# Patient Record
Sex: FEMALE | Race: BLACK OR AFRICAN AMERICAN | NOT HISPANIC OR LATINO | Employment: UNEMPLOYED | URBAN - METROPOLITAN AREA
[De-identification: names, ages, dates, MRNs, and addresses within clinical notes are randomized per-mention and may not be internally consistent; named-entity substitution may affect disease eponyms.]

---

## 2017-01-05 ENCOUNTER — TELEPHONE (OUTPATIENT)
Dept: GYNECOLOGIC ONCOLOGY | Facility: CLINIC | Age: 57
End: 2017-01-05

## 2017-01-05 NOTE — TELEPHONE ENCOUNTER
----- Message from Annia Ng sent at 1/5/2017  2:04 PM CST -----  Contact: self  Pt is calling to schedule her f/u apt.     Contact number is [phone]

## 2017-01-05 NOTE — TELEPHONE ENCOUNTER
Spoke with pt and scheduled her 6 month follow up with DR Zhao on 01/16/2017 at 1:30 [facility] location appt letter mailed to address on file

## 2017-01-16 ENCOUNTER — OFFICE VISIT (OUTPATIENT)
Dept: GYNECOLOGIC ONCOLOGY | Facility: CLINIC | Age: 57
End: 2017-01-16
Attending: OBSTETRICS & GYNECOLOGY
Payer: MEDICAID

## 2017-01-16 VITALS
HEIGHT: 67 IN | BODY MASS INDEX: 33.21 KG/M2 | HEART RATE: 69 BPM | WEIGHT: 211.63 LBS | SYSTOLIC BLOOD PRESSURE: 180 MMHG | DIASTOLIC BLOOD PRESSURE: 92 MMHG

## 2017-01-16 DIAGNOSIS — N89.3 VAGINAL DYSPLASIA: Primary | ICD-10-CM

## 2017-01-16 PROCEDURE — 99999 PR PBB SHADOW E&M-EST. PATIENT-LVL III: CPT | Mod: PBBFAC,,, | Performed by: OBSTETRICS & GYNECOLOGY

## 2017-01-16 PROCEDURE — 99214 OFFICE O/P EST MOD 30 MIN: CPT | Mod: S$PBB,,, | Performed by: OBSTETRICS & GYNECOLOGY

## 2017-01-16 PROCEDURE — 99213 OFFICE O/P EST LOW 20 MIN: CPT | Mod: PBBFAC | Performed by: OBSTETRICS & GYNECOLOGY

## 2017-01-16 PROCEDURE — 88175 CYTOPATH C/V AUTO FLUID REDO: CPT | Performed by: PATHOLOGY

## 2017-01-16 PROCEDURE — 88141 CYTOPATH C/V INTERPRET: CPT | Mod: ,,, | Performed by: PATHOLOGY

## 2017-01-16 RX ORDER — OMEPRAZOLE 40 MG/1
CAPSULE, DELAYED RELEASE ORAL
Refills: 4 | COMMUNITY
Start: 2017-01-06 | End: 2018-09-14

## 2017-01-22 NOTE — PROGRESS NOTES
Subjective:      Patient ID: Yanique Thomson is a 56 y.o. female.    Chief Complaint: Vaginal Dysplasia (6 month)      HPI  Patient is a 56yp female who originally presented as a referral from Dr. Addis Galeana for moderate vaginal dysplasia. 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     Presents today for follow up visit. Without gynecologic complaints.     Review of Systems   Constitutional: Negative for appetite change, chills, fatigue and fever.   HENT: Negative for mouth sores.    Respiratory: Negative for cough and shortness of breath.    Cardiovascular: Negative for leg swelling.   Gastrointestinal: Negative for abdominal pain, blood in stool, constipation and diarrhea.   Endocrine: Negative for cold intolerance.   Genitourinary: Negative for dysuria and vaginal bleeding.   Musculoskeletal: Negative for myalgias.   Skin: Negative for rash.   Allergic/Immunologic: Negative.    Neurological: Negative for weakness and numbness.   Hematological: Negative for adenopathy. Does not bruise/bleed easily.   Psychiatric/Behavioral: Negative for confusion.        Objective:   Physical Exam:   Constitutional: She is oriented to person, place, and time. She appears well-developed and well-nourished.    HENT:   Head: Normocephalic and atraumatic.    Eyes: EOM are normal. Pupils are equal, round, and reactive to light.    Neck: Normal range of motion. Neck supple. No thyromegaly present.    Cardiovascular: Normal rate, regular rhythm and intact distal pulses.     Pulmonary/Chest: Effort normal and breath sounds normal. No respiratory distress. She has no wheezes.        Abdominal: Soft. Bowel sounds are normal. She exhibits no distension, no ascites and no mass. There is no tenderness.     Genitourinary: Rectum normal and vagina normal. Pelvic exam was performed with patient supine. There is  no lesion on the right labia. There is no lesion on the left labia. Uterus is absent. Right adnexum displays no mass. Left adnexum displays no mass. Vaginal cuff normal.          Musculoskeletal: Normal range of motion and moves all extremeties.      Lymphadenopathy:     She has no cervical adenopathy.        Right: No inguinal and no supraclavicular adenopathy present.        Left: No inguinal and no supraclavicular adenopathy present.    Neurological: She is alert and oriented to person, place, and time.    Skin: Skin is warm and dry. No rash noted.    Psychiatric: She has a normal mood and affect.       Assessment:     1. Vaginal dysplasia      - no evidence of disease on today's exam  - pap collected    Plan:   No orders of the defined types were placed in this encounter.    1. RTC 6 months for sooner if needed pending pap smear results

## 2017-01-25 ENCOUNTER — TELEPHONE (OUTPATIENT)
Dept: GYNECOLOGIC ONCOLOGY | Facility: CLINIC | Age: 57
End: 2017-01-25

## 2017-01-25 NOTE — TELEPHONE ENCOUNTER
----- Message from Carlene Zhao MD sent at 1/25/2017  4:16 PM CST -----  Please let patient know her pap smear was normal. Plan for follow up visit in 6 months. Thank you.

## 2017-02-01 DIAGNOSIS — N89.3 VAGINAL DYSPLASIA: ICD-10-CM

## 2017-02-01 DIAGNOSIS — N95.2 VAGINAL ATROPHY: ICD-10-CM

## 2017-02-01 RX ORDER — ESTRADIOL 0.1 MG/G
CREAM VAGINAL
Qty: 42.5 G | Refills: 0 | OUTPATIENT
Start: 2017-02-01

## 2017-07-20 ENCOUNTER — TELEPHONE (OUTPATIENT)
Dept: GYNECOLOGIC ONCOLOGY | Facility: CLINIC | Age: 57
End: 2017-07-20

## 2017-07-20 NOTE — TELEPHONE ENCOUNTER
----- Message from Dayanara Smalls sent at 7/20/2017 12:52 PM CDT -----  Contact: Pt  Pt would like to reschedule her 07/17 appt she canceled     Pt contact number [phone]  Thanks

## 2017-07-24 ENCOUNTER — OFFICE VISIT (OUTPATIENT)
Dept: GYNECOLOGIC ONCOLOGY | Facility: CLINIC | Age: 57
End: 2017-07-24
Attending: OBSTETRICS & GYNECOLOGY
Payer: MEDICAID

## 2017-07-24 VITALS
WEIGHT: 213.19 LBS | DIASTOLIC BLOOD PRESSURE: 73 MMHG | BODY MASS INDEX: 33.39 KG/M2 | SYSTOLIC BLOOD PRESSURE: 136 MMHG | HEART RATE: 68 BPM

## 2017-07-24 DIAGNOSIS — N89.3 VAGINAL DYSPLASIA: ICD-10-CM

## 2017-07-24 DIAGNOSIS — L02.92 FURUNCLE: Primary | ICD-10-CM

## 2017-07-24 PROCEDURE — 88175 CYTOPATH C/V AUTO FLUID REDO: CPT

## 2017-07-24 PROCEDURE — 99212 OFFICE O/P EST SF 10 MIN: CPT | Mod: PBBFAC | Performed by: OBSTETRICS & GYNECOLOGY

## 2017-07-24 PROCEDURE — 99213 OFFICE O/P EST LOW 20 MIN: CPT | Mod: S$PBB,,, | Performed by: OBSTETRICS & GYNECOLOGY

## 2017-07-24 PROCEDURE — 99999 PR PBB SHADOW E&M-EST. PATIENT-LVL II: CPT | Mod: PBBFAC,,, | Performed by: OBSTETRICS & GYNECOLOGY

## 2017-07-24 RX ORDER — SULFAMETHOXAZOLE AND TRIMETHOPRIM 800; 160 MG/1; MG/1
1 TABLET ORAL 2 TIMES DAILY
Qty: 20 TABLET | Refills: 0 | Status: SHIPPED | OUTPATIENT
Start: 2017-07-24 | End: 2017-08-03

## 2017-07-24 RX ORDER — PANCRELIPASE LIPASE, PANCRELIPASE AMYLASE, AND PANCRELIPASE PROTEASE 10500; 61500; 35500 [USP'U]/1; [USP'U]/1; [USP'U]/1
CAPSULE, DELAYED RELEASE ORAL
Refills: 4 | COMMUNITY
Start: 2017-07-20 | End: 2018-08-25

## 2017-07-30 NOTE — PROGRESS NOTES
Subjective:      Patient ID: Yanique Thomson is a 57 y.o. female.    Chief Complaint: Follow-up (6mth)      HPI  Patient is a 56yp female who originally presented as a referral from Dr. Addis Galeana for moderate vaginal dysplasia. 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      Follow up pap 1/16/17 negative for dysplasia.     Presents today for follow up visit. Without gynecologic complaints.   Review of Systems   Constitutional: Negative for appetite change, chills, fatigue and fever.   HENT: Negative for mouth sores.    Respiratory: Negative for cough and shortness of breath.    Cardiovascular: Negative for leg swelling.   Gastrointestinal: Negative for abdominal pain, blood in stool, constipation and diarrhea.   Endocrine: Negative for cold intolerance.   Genitourinary: Negative for dysuria and vaginal bleeding.   Musculoskeletal: Negative for myalgias.   Skin: Negative for rash.   Allergic/Immunologic: Negative.    Neurological: Negative for weakness and numbness.   Hematological: Negative for adenopathy. Does not bruise/bleed easily.   Psychiatric/Behavioral: Negative for confusion.        Objective:   Physical Exam:   Constitutional: She is oriented to person, place, and time. She appears well-developed and well-nourished.    HENT:   Head: Normocephalic and atraumatic.    Eyes: EOM are normal. Pupils are equal, round, and reactive to light.    Neck: Normal range of motion. Neck supple. No thyromegaly present.    Cardiovascular: Normal rate, regular rhythm and intact distal pulses.     Pulmonary/Chest: Effort normal and breath sounds normal. No respiratory distress. She has no wheezes.        Abdominal: Soft. Bowel sounds are normal. She exhibits no distension, no ascites and no mass. There is no tenderness.     Genitourinary: Rectum normal and vagina normal. Pelvic exam was  performed with patient supine. There is no lesion on the right labia. There is no lesion on the left labia. Uterus is absent. Right adnexum displays no mass. Left adnexum displays no mass. Vaginal cuff normal.Cervix exhibits absence. Additional cervical findings: pap smear done          Musculoskeletal: Normal range of motion and moves all extremeties.      Lymphadenopathy:     She has no cervical adenopathy.        Right: No inguinal and no supraclavicular adenopathy present.        Left: No inguinal and no supraclavicular adenopathy present.    Neurological: She is alert and oriented to person, place, and time.    Skin: Skin is warm and dry. No rash noted.    Psychiatric: She has a normal mood and affect.       Assessment:     1. Furuncle    2. Vaginal dysplasia      - no gross evidence of disease on today's exam  - pap collected    Plan:   No orders of the defined types were placed in this encounter.    If pap remains normal will plan for return to routine gynecologic care with Dr. Galeana.

## 2017-07-31 ENCOUNTER — TELEPHONE (OUTPATIENT)
Dept: GYNECOLOGIC ONCOLOGY | Facility: CLINIC | Age: 57
End: 2017-07-31

## 2017-07-31 NOTE — TELEPHONE ENCOUNTER
----- Message from Carlene Zhao MD sent at 7/29/2017  1:42 PM CDT -----  Please let patient know her pap smear was normal. Thank you.

## 2017-10-13 ENCOUNTER — HOSPITAL ENCOUNTER (EMERGENCY)
Facility: HOSPITAL | Age: 57
Discharge: HOME | End: 2017-10-14
Attending: EMERGENCY MEDICINE
Payer: MEDICAID

## 2017-10-13 VITALS
DIASTOLIC BLOOD PRESSURE: 97 MMHG | OXYGEN SATURATION: 99 % | WEIGHT: 200 LBS | TEMPERATURE: 98 F | BODY MASS INDEX: 32.14 KG/M2 | HEART RATE: 72 BPM | RESPIRATION RATE: 18 BRPM | SYSTOLIC BLOOD PRESSURE: 163 MMHG | HEIGHT: 66 IN

## 2017-10-13 DIAGNOSIS — N39.0 URINARY TRACT INFECTION WITHOUT HEMATURIA, SITE UNSPECIFIED: Primary | ICD-10-CM

## 2017-10-13 DIAGNOSIS — B37.31 VAGINAL CANDIDIASIS: ICD-10-CM

## 2017-10-13 LAB
BACTERIA #/AREA URNS HPF: ABNORMAL /HPF
BACTERIA GENITAL QL WET PREP: ABNORMAL
BILIRUB UR QL STRIP: NEGATIVE
CLARITY UR: CLEAR
CLUE CELLS VAG QL WET PREP: ABNORMAL
COLOR UR: YELLOW
FILAMENT FUNGI VAG WET PREP-#/AREA: ABNORMAL
GLUCOSE UR QL STRIP: NEGATIVE
HGB UR QL STRIP: NEGATIVE
KETONES UR QL STRIP: NEGATIVE
LEUKOCYTE ESTERASE UR QL STRIP: ABNORMAL
MICROSCOPIC COMMENT: ABNORMAL
NITRITE UR QL STRIP: NEGATIVE
PH UR STRIP: 6 [PH] (ref 5–8)
PROT UR QL STRIP: NEGATIVE
SP GR UR STRIP: 1.02 (ref 1–1.03)
SPECIMEN SOURCE: ABNORMAL
SQUAMOUS #/AREA URNS HPF: 7 /HPF
T VAGINALIS GENITAL QL WET PREP: ABNORMAL
URN SPEC COLLECT METH UR: ABNORMAL
UROBILINOGEN UR STRIP-ACNC: NEGATIVE EU/DL
WBC #/AREA URNS HPF: 7 /HPF (ref 0–5)
WBC #/AREA VAG WET PREP: ABNORMAL
YEAST GENITAL QL WET PREP: ABNORMAL

## 2017-10-13 PROCEDURE — 99284 EMERGENCY DEPT VISIT MOD MDM: CPT

## 2017-10-13 PROCEDURE — 81000 URINALYSIS NONAUTO W/SCOPE: CPT

## 2017-10-13 PROCEDURE — 87086 URINE CULTURE/COLONY COUNT: CPT

## 2017-10-13 PROCEDURE — 87591 N.GONORRHOEAE DNA AMP PROB: CPT

## 2017-10-13 PROCEDURE — 87210 SMEAR WET MOUNT SALINE/INK: CPT

## 2017-10-13 RX ORDER — PHENAZOPYRIDINE HYDROCHLORIDE 100 MG/1
200 TABLET, FILM COATED ORAL
Status: COMPLETED | OUTPATIENT
Start: 2017-10-14 | End: 2017-10-14

## 2017-10-13 RX ORDER — FLUCONAZOLE 50 MG/1
200 TABLET ORAL
Status: COMPLETED | OUTPATIENT
Start: 2017-10-14 | End: 2017-10-14

## 2017-10-13 RX ORDER — CLOTRIMAZOLE 1 %
CREAM (GRAM) TOPICAL
Qty: 15 G | Refills: 0 | Status: SHIPPED | OUTPATIENT
Start: 2017-10-13 | End: 2018-09-13

## 2017-10-13 RX ORDER — PHENAZOPYRIDINE HYDROCHLORIDE 200 MG/1
200 TABLET, FILM COATED ORAL 3 TIMES DAILY
Qty: 6 TABLET | Refills: 0 | Status: SHIPPED | OUTPATIENT
Start: 2017-10-13 | End: 2017-10-23

## 2017-10-13 RX ORDER — CEPHALEXIN 250 MG/1
500 CAPSULE ORAL
Status: COMPLETED | OUTPATIENT
Start: 2017-10-14 | End: 2017-10-14

## 2017-10-13 RX ORDER — CEPHALEXIN 500 MG/1
500 CAPSULE ORAL EVERY 8 HOURS
Qty: 21 CAPSULE | Refills: 0 | Status: ON HOLD | OUTPATIENT
Start: 2017-10-13 | End: 2017-10-16 | Stop reason: HOSPADM

## 2017-10-14 PROCEDURE — 25000003 PHARM REV CODE 250: Performed by: NURSE PRACTITIONER

## 2017-10-14 RX ADMIN — PHENAZOPYRIDINE HYDROCHLORIDE 200 MG: 100 TABLET ORAL at 12:10

## 2017-10-14 RX ADMIN — FLUCONAZOLE 200 MG: 50 TABLET ORAL at 12:10

## 2017-10-14 RX ADMIN — CEPHALEXIN 500 MG: 250 CAPSULE ORAL at 12:10

## 2017-10-14 NOTE — ED PROVIDER NOTES
"Encounter Date: 10/13/2017    SCRIBE #1 NOTE: I, Mauricio Durán, am scribing for, and in the presence of,  Kaity Tucker NP. I have scribed the following portions of the note - Other sections scribed: HPI and ROS.       History     Chief Complaint   Patient presents with    Vaginal Itching     itching and feeling like"on fire". times 5 days. with white discharge    Dysuria     burning with urination times 5 days and taking AZO with no relief.      CC: Vaginal Itching; Dysuria    HPI: This 57 y.o. Female with epilepsy, GERD, hyperchloremia, HTN and moderate vaginal dysplasia presents to the ED c/o acute onset dysuria, vaginal discharge, reported fever Tmax 101 and decreased appetite which began x5 days ago. The patient states that her vagina burns even when she does not urinate and feels comfortable only when her legs are open. She reports that she did not eat today but had a half of a smoothie. She states that her urine is abnormally yellow and all she drinks is water. She complies with TOPAMAX 200 and 50 and CARBATROL for her epilepsy. She has not taken any medications outside of her normal at home medications for her symptoms. She denies back pain, abdominal pain, chest pain, nausea, emesis or chills. No other symptoms or alleviating factors present.      The history is provided by the patient. No  was used.     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    Seizures      Past Surgical History:   Procedure Laterality Date    APPENDECTOMY      CHOLECYSTECTOMY      HYSTERECTOMY      laser to vaginal      PARTIAL HYMENECTOMY      sweat glands Bilateral     removial      Family History   Problem Relation Age of Onset    Hypertension Father     Hypertension Mother     Diabetes " Sister     Cancer Sister      Social History   Substance Use Topics    Smoking status: Never Smoker    Smokeless tobacco: Not on file    Alcohol use 0.6 oz/week     1 Glasses of wine per week      Comment: occasional     Review of Systems   Constitutional: Positive for appetite change (decreased) and fever (Tmax = 101). Negative for chills.   HENT: Negative for ear pain and rhinorrhea.    Eyes: Negative for redness.   Respiratory: Negative for shortness of breath.    Cardiovascular: Negative for chest pain.   Gastrointestinal: Negative for abdominal pain, diarrhea, nausea and vomiting.   Genitourinary: Positive for dysuria (urine abnormally yellow) and vaginal discharge. Negative for hematuria.   Musculoskeletal: Negative for back pain and neck pain.   Skin: Negative for rash.   Neurological: Negative for light-headedness and headaches.       Physical Exam     Initial Vitals [10/13/17 2054]   BP Pulse Resp Temp SpO2   (!) 189/82 79 18 97.8 °F (36.6 °C) 98 %      MAP       117.67         Physical Exam    Nursing note and vitals reviewed.  Constitutional: She appears well-developed and well-nourished.   HENT:   Head: Normocephalic.   Eyes: Conjunctivae are normal.   Neck: Normal range of motion. Neck supple.   Cardiovascular: Normal rate, regular rhythm and normal heart sounds.   Pulmonary/Chest: Breath sounds normal.   Genitourinary: Vaginal discharge found.   Genitourinary Comments: There is a thick white discharge adhering to the vaginal walls.     Musculoskeletal: Normal range of motion.   Neurological: She is alert and oriented to person, place, and time.   Skin: Skin is warm and dry.         ED Course   Procedures  Labs Reviewed   VAGINAL SCREEN - Abnormal; Notable for the following:        Result Value    WBC - Vaginal Screen Moderate (*)     Bacteria - Vaginal Screen Moderate (*)     All other components within normal limits   URINALYSIS - Abnormal; Notable for the following:     Leukocytes, UA 3+ (*)      All other components within normal limits   URINALYSIS MICROSCOPIC - Abnormal; Notable for the following:     WBC, UA 7 (*)     Bacteria, UA Few (*)     All other components within normal limits   CULTURE, URINE   C. TRACHOMATIS/N. GONORRHOEAE BY AMP DNA             Medical Decision Making:   Initial Assessment:   49-year-old female presents with vaginal itching and dysuria.  Differential Diagnosis:   Candidiasis  UTI  STI  ED Management:  Diagnosis management comments: This is an urgent evaluation of a 57-year-old female that presented to the ER with c/o vaginal itching and dysuria. Pts exam was as above.     Labs were reviewed and discussed with pt.     Based on exam today - I have low suspicion for medical, surgical or other life threatening condition.  I believe patient has a urinary tract infection accompanied by a vaginal yeast infection.  I have dosed patient with chronic is all, Keflex, Pyridium in emergency department I will discharge patient with prescriptions for Keflex, Pyridium, and Chlortrimazole.  Encouraged patient to apply the Chlortrimazole cream to the external genitalia which will help with the discomfort and itch.  I believe pt is safe for discharge and outpatient f/u.    Pt verbalizes understanding of d/c instructions and will return for worsening condition.    Case discussed with attending who agrees with assessment and plan.               Scribe Attestation:   Scribe #1: I performed the above scribed service and the documentation accurately describes the services I performed. I attest to the accuracy of the note.    Attending Attestation:           Physician Attestation for Scribe:  Physician Attestation Statement for Scribe #1: I, Kaity Tucker NP, reviewed documentation, as scribed by Mauricio Durán in my presence, and it is both accurate and complete.                 ED Course      Clinical Impression:   The primary encounter diagnosis was Urinary tract infection without hematuria,  site unspecified. A diagnosis of Vaginal candidiasis was also pertinent to this visit.    Disposition:   Disposition: Discharged  Condition: Stable                        Kaity Tucker NP  10/14/17 0125

## 2017-10-14 NOTE — ED NOTES
"Patient presented to the ED stating that "her vagina is burning and pressure with urination, and she has a discharge." Patient stated that she took AZO for the last 5 days and it isn't helping. Patient stated that he urine is dark yellow. Patient denies any NVD.   "

## 2017-10-15 ENCOUNTER — HOSPITAL ENCOUNTER (OUTPATIENT)
Facility: HOSPITAL | Age: 57
Discharge: HOME | End: 2017-10-16
Attending: EMERGENCY MEDICINE | Admitting: HOSPITALIST
Payer: MEDICAID

## 2017-10-15 DIAGNOSIS — R51.9 NONINTRACTABLE HEADACHE, UNSPECIFIED CHRONICITY PATTERN, UNSPECIFIED HEADACHE TYPE: ICD-10-CM

## 2017-10-15 DIAGNOSIS — Z86.69 HISTORY OF SEIZURE DISORDER: ICD-10-CM

## 2017-10-15 DIAGNOSIS — N89.3 VAGINAL DYSPLASIA: ICD-10-CM

## 2017-10-15 DIAGNOSIS — B37.31 VAGINAL YEAST INFECTION: ICD-10-CM

## 2017-10-15 DIAGNOSIS — R51.9 HEADACHE ON TOP OF HEAD: ICD-10-CM

## 2017-10-15 DIAGNOSIS — R55 SYNCOPE, UNSPECIFIED SYNCOPE TYPE: ICD-10-CM

## 2017-10-15 DIAGNOSIS — N30.00 ACUTE CYSTITIS WITHOUT HEMATURIA: ICD-10-CM

## 2017-10-15 DIAGNOSIS — I10 HYPERTENSION, ESSENTIAL: ICD-10-CM

## 2017-10-15 DIAGNOSIS — R07.9 CHEST PAIN WITH MODERATE RISK OF ACUTE CORONARY SYNDROME: Primary | ICD-10-CM

## 2017-10-15 LAB
ALBUMIN SERPL BCP-MCNC: 3.9 G/DL
ALP SERPL-CCNC: 68 U/L
ALT SERPL W/O P-5'-P-CCNC: 14 U/L
ANION GAP SERPL CALC-SCNC: 6 MMOL/L
APTT BLDCRRT: 26.6 SEC
AST SERPL-CCNC: 20 U/L
BACTERIA #/AREA URNS HPF: NORMAL /HPF
BACTERIA UR CULT: NORMAL
BASOPHILS # BLD AUTO: 0.05 K/UL
BASOPHILS NFR BLD: 0.8 %
BILIRUB SERPL-MCNC: 0.4 MG/DL
BILIRUB UR QL STRIP: NEGATIVE
BUN SERPL-MCNC: 17 MG/DL
C TRACH DNA SPEC QL NAA+PROBE: NOT DETECTED
CALCIUM SERPL-MCNC: 9.5 MG/DL
CHLORIDE SERPL-SCNC: 110 MMOL/L
CHOLEST SERPL-MCNC: 179 MG/DL
CHOLEST/HDLC SERPL: 3.1 {RATIO}
CLARITY UR: CLEAR
CO2 SERPL-SCNC: 23 MMOL/L
COLOR UR: ABNORMAL
CREAT SERPL-MCNC: 1 MG/DL
DIFFERENTIAL METHOD: ABNORMAL
EOSINOPHIL # BLD AUTO: 0.1 K/UL
EOSINOPHIL NFR BLD: 1.4 %
ERYTHROCYTE [DISTWIDTH] IN BLOOD BY AUTOMATED COUNT: 13.6 %
EST. GFR  (AFRICAN AMERICAN): >60 ML/MIN/1.73 M^2
EST. GFR  (NON AFRICAN AMERICAN): >60 ML/MIN/1.73 M^2
GLUCOSE SERPL-MCNC: 92 MG/DL
GLUCOSE UR QL STRIP: NEGATIVE
HCT VFR BLD AUTO: 38.2 %
HDLC SERPL-MCNC: 57 MG/DL
HDLC SERPL: 31.8 %
HGB BLD-MCNC: 12.6 G/DL
HGB UR QL STRIP: NEGATIVE
INR PPP: 1
KETONES UR QL STRIP: NEGATIVE
LDLC SERPL CALC-MCNC: 110.4 MG/DL
LEUKOCYTE ESTERASE UR QL STRIP: NEGATIVE
LYMPHOCYTES # BLD AUTO: 2.8 K/UL
LYMPHOCYTES NFR BLD: 44 %
MAGNESIUM SERPL-MCNC: 2 MG/DL
MCH RBC QN AUTO: 31.7 PG
MCHC RBC AUTO-ENTMCNC: 33 G/DL
MCV RBC AUTO: 96 FL
MICROSCOPIC COMMENT: NORMAL
MONOCYTES # BLD AUTO: 0.7 K/UL
MONOCYTES NFR BLD: 10.4 %
N GONORRHOEA DNA SPEC QL NAA+PROBE: NOT DETECTED
NEUTROPHILS # BLD AUTO: 2.8 K/UL
NEUTROPHILS NFR BLD: 43.4 %
NITRITE UR QL STRIP: POSITIVE
NONHDLC SERPL-MCNC: 122 MG/DL
PH UR STRIP: 6 [PH] (ref 5–8)
PLATELET # BLD AUTO: 232 K/UL
PMV BLD AUTO: 9 FL
POTASSIUM SERPL-SCNC: 3.7 MMOL/L
PROT SERPL-MCNC: 7.9 G/DL
PROT UR QL STRIP: NEGATIVE
PROTHROMBIN TIME: 11 SEC
RBC # BLD AUTO: 3.98 M/UL
SODIUM SERPL-SCNC: 139 MMOL/L
SP GR UR STRIP: 1.02 (ref 1–1.03)
SQUAMOUS #/AREA URNS HPF: 2 /HPF
TRIGL SERPL-MCNC: 58 MG/DL
TROPONIN I SERPL DL<=0.01 NG/ML-MCNC: <0.006 NG/ML
TROPONIN I SERPL DL<=0.01 NG/ML-MCNC: <0.006 NG/ML
TSH SERPL DL<=0.005 MIU/L-ACNC: 0.98 UIU/ML
URN SPEC COLLECT METH UR: ABNORMAL
UROBILINOGEN UR STRIP-ACNC: ABNORMAL EU/DL
WBC # BLD AUTO: 6.43 K/UL
WBC #/AREA URNS HPF: 2 /HPF (ref 0–5)

## 2017-10-15 PROCEDURE — 63600175 PHARM REV CODE 636 W HCPCS: Performed by: NURSE PRACTITIONER

## 2017-10-15 PROCEDURE — 84443 ASSAY THYROID STIM HORMONE: CPT

## 2017-10-15 PROCEDURE — 96375 TX/PRO/DX INJ NEW DRUG ADDON: CPT

## 2017-10-15 PROCEDURE — 93010 ELECTROCARDIOGRAM REPORT: CPT | Mod: ,,, | Performed by: INTERNAL MEDICINE

## 2017-10-15 PROCEDURE — 83735 ASSAY OF MAGNESIUM: CPT

## 2017-10-15 PROCEDURE — 85730 THROMBOPLASTIN TIME PARTIAL: CPT

## 2017-10-15 PROCEDURE — 36415 COLL VENOUS BLD VENIPUNCTURE: CPT

## 2017-10-15 PROCEDURE — 85610 PROTHROMBIN TIME: CPT

## 2017-10-15 PROCEDURE — 80061 LIPID PANEL: CPT

## 2017-10-15 PROCEDURE — 84484 ASSAY OF TROPONIN QUANT: CPT | Mod: 91

## 2017-10-15 PROCEDURE — 96374 THER/PROPH/DIAG INJ IV PUSH: CPT

## 2017-10-15 PROCEDURE — 25000003 PHARM REV CODE 250: Performed by: EMERGENCY MEDICINE

## 2017-10-15 PROCEDURE — 96361 HYDRATE IV INFUSION ADD-ON: CPT

## 2017-10-15 PROCEDURE — 63600175 PHARM REV CODE 636 W HCPCS: Performed by: EMERGENCY MEDICINE

## 2017-10-15 PROCEDURE — 84484 ASSAY OF TROPONIN QUANT: CPT

## 2017-10-15 PROCEDURE — 93005 ELECTROCARDIOGRAM TRACING: CPT

## 2017-10-15 PROCEDURE — 99285 EMERGENCY DEPT VISIT HI MDM: CPT | Mod: 25

## 2017-10-15 PROCEDURE — G0378 HOSPITAL OBSERVATION PER HR: HCPCS

## 2017-10-15 PROCEDURE — 85025 COMPLETE CBC W/AUTO DIFF WBC: CPT

## 2017-10-15 PROCEDURE — 87086 URINE CULTURE/COLONY COUNT: CPT

## 2017-10-15 PROCEDURE — 80053 COMPREHEN METABOLIC PANEL: CPT

## 2017-10-15 PROCEDURE — 81000 URINALYSIS NONAUTO W/SCOPE: CPT

## 2017-10-15 PROCEDURE — 25000003 PHARM REV CODE 250: Performed by: NURSE PRACTITIONER

## 2017-10-15 RX ORDER — ASPIRIN 81 MG/1
81 TABLET ORAL DAILY
Status: DISCONTINUED | OUTPATIENT
Start: 2017-10-16 | End: 2017-10-16 | Stop reason: HOSPADM

## 2017-10-15 RX ORDER — MAG HYDROX/ALUMINUM HYD/SIMETH 200-200-20
60 SUSPENSION, ORAL (FINAL DOSE FORM) ORAL
Status: COMPLETED | OUTPATIENT
Start: 2017-10-15 | End: 2017-10-15

## 2017-10-15 RX ORDER — ONDANSETRON 4 MG/1
4 TABLET, FILM COATED ORAL EVERY 8 HOURS PRN
Status: DISCONTINUED | OUTPATIENT
Start: 2017-10-15 | End: 2017-10-16 | Stop reason: HOSPADM

## 2017-10-15 RX ORDER — ASPIRIN 325 MG
325 TABLET ORAL DAILY
Status: DISCONTINUED | OUTPATIENT
Start: 2017-10-15 | End: 2017-10-15

## 2017-10-15 RX ORDER — CARBAMAZEPINE 100 MG/1
300 CAPSULE, EXTENDED RELEASE ORAL 2 TIMES DAILY
Status: DISCONTINUED | OUTPATIENT
Start: 2017-10-15 | End: 2017-10-16 | Stop reason: CLARIF

## 2017-10-15 RX ORDER — TOPIRAMATE 25 MG/1
50 TABLET ORAL 2 TIMES DAILY
Status: DISCONTINUED | OUTPATIENT
Start: 2017-10-15 | End: 2017-10-16 | Stop reason: HOSPADM

## 2017-10-15 RX ORDER — NITROFURANTOIN 25; 75 MG/1; MG/1
100 CAPSULE ORAL EVERY 12 HOURS
Status: DISCONTINUED | OUTPATIENT
Start: 2017-10-15 | End: 2017-10-15

## 2017-10-15 RX ORDER — HYDROMORPHONE HYDROCHLORIDE 2 MG/ML
1 INJECTION, SOLUTION INTRAMUSCULAR; INTRAVENOUS; SUBCUTANEOUS
Status: COMPLETED | OUTPATIENT
Start: 2017-10-15 | End: 2017-10-15

## 2017-10-15 RX ORDER — SIMVASTATIN 10 MG/1
20 TABLET, FILM COATED ORAL NIGHTLY
Status: DISCONTINUED | OUTPATIENT
Start: 2017-10-15 | End: 2017-10-16 | Stop reason: HOSPADM

## 2017-10-15 RX ORDER — GABAPENTIN 300 MG/1
300 CAPSULE ORAL 3 TIMES DAILY
Status: DISCONTINUED | OUTPATIENT
Start: 2017-10-15 | End: 2017-10-16 | Stop reason: HOSPADM

## 2017-10-15 RX ORDER — POLYETHYLENE GLYCOL 3350 17 G/17G
17 POWDER, FOR SOLUTION ORAL DAILY
Status: DISCONTINUED | OUTPATIENT
Start: 2017-10-16 | End: 2017-10-16 | Stop reason: HOSPADM

## 2017-10-15 RX ORDER — CEPHALEXIN 250 MG/1
1000 CAPSULE ORAL EVERY 12 HOURS
Status: DISCONTINUED | OUTPATIENT
Start: 2017-10-15 | End: 2017-10-15

## 2017-10-15 RX ORDER — LABETALOL 100 MG/1
100 TABLET, FILM COATED ORAL 2 TIMES DAILY
Status: DISCONTINUED | OUTPATIENT
Start: 2017-10-15 | End: 2017-10-16 | Stop reason: HOSPADM

## 2017-10-15 RX ORDER — ENOXAPARIN SODIUM 100 MG/ML
40 INJECTION SUBCUTANEOUS EVERY 24 HOURS
Status: DISCONTINUED | OUTPATIENT
Start: 2017-10-15 | End: 2017-10-16 | Stop reason: HOSPADM

## 2017-10-15 RX ORDER — NITROGLYCERIN 0.4 MG/1
0.4 TABLET SUBLINGUAL EVERY 5 MIN PRN
Status: DISCONTINUED | OUTPATIENT
Start: 2017-10-15 | End: 2017-10-16 | Stop reason: HOSPADM

## 2017-10-15 RX ORDER — BUMETANIDE 1 MG/1
1 TABLET ORAL DAILY
Status: DISCONTINUED | OUTPATIENT
Start: 2017-10-15 | End: 2017-10-16 | Stop reason: HOSPADM

## 2017-10-15 RX ORDER — BUMETANIDE 1 MG/1
1 TABLET ORAL DAILY
Status: DISCONTINUED | OUTPATIENT
Start: 2017-10-16 | End: 2017-10-15

## 2017-10-15 RX ORDER — ONDANSETRON 2 MG/ML
8 INJECTION INTRAMUSCULAR; INTRAVENOUS
Status: COMPLETED | OUTPATIENT
Start: 2017-10-15 | End: 2017-10-15

## 2017-10-15 RX ORDER — TOPIRAMATE 100 MG/1
200 TABLET, FILM COATED ORAL 2 TIMES DAILY
Status: DISCONTINUED | OUTPATIENT
Start: 2017-10-15 | End: 2017-10-15

## 2017-10-15 RX ORDER — PANTOPRAZOLE SODIUM 40 MG/1
40 TABLET, DELAYED RELEASE ORAL DAILY
Status: DISCONTINUED | OUTPATIENT
Start: 2017-10-16 | End: 2017-10-16 | Stop reason: HOSPADM

## 2017-10-15 RX ORDER — OXYCODONE AND ACETAMINOPHEN 5; 325 MG/1; MG/1
1 TABLET ORAL EVERY 4 HOURS PRN
Status: DISCONTINUED | OUTPATIENT
Start: 2017-10-15 | End: 2017-10-16 | Stop reason: HOSPADM

## 2017-10-15 RX ORDER — NITROGLYCERIN 0.4 MG/1
0.4 TABLET SUBLINGUAL
Status: COMPLETED | OUTPATIENT
Start: 2017-10-15 | End: 2017-10-15

## 2017-10-15 RX ORDER — PANTOPRAZOLE SODIUM 40 MG/1
80 TABLET, DELAYED RELEASE ORAL
Status: COMPLETED | OUTPATIENT
Start: 2017-10-15 | End: 2017-10-15

## 2017-10-15 RX ORDER — FLUCONAZOLE 100 MG/1
200 TABLET ORAL ONCE
Status: COMPLETED | OUTPATIENT
Start: 2017-10-15 | End: 2017-10-15

## 2017-10-15 RX ORDER — AMOXICILLIN AND CLAVULANATE POTASSIUM 875; 125 MG/1; MG/1
1 TABLET, FILM COATED ORAL EVERY 12 HOURS
Status: DISCONTINUED | OUTPATIENT
Start: 2017-10-16 | End: 2017-10-16 | Stop reason: HOSPADM

## 2017-10-15 RX ORDER — AMOXICILLIN AND CLAVULANATE POTASSIUM 875; 125 MG/1; MG/1
1 TABLET, FILM COATED ORAL EVERY 12 HOURS
Status: DISCONTINUED | OUTPATIENT
Start: 2017-10-15 | End: 2017-10-15

## 2017-10-15 RX ORDER — PHENAZOPYRIDINE HYDROCHLORIDE 100 MG/1
200 TABLET, FILM COATED ORAL 3 TIMES DAILY
Status: DISCONTINUED | OUTPATIENT
Start: 2017-10-15 | End: 2017-10-15

## 2017-10-15 RX ORDER — MAG HYDROX/ALUMINUM HYD/SIMETH 200-200-20
30 SUSPENSION, ORAL (FINAL DOSE FORM) ORAL
Status: DISCONTINUED | OUTPATIENT
Start: 2017-10-15 | End: 2017-10-16 | Stop reason: HOSPADM

## 2017-10-15 RX ORDER — LEVETIRACETAM 500 MG/1
1000 TABLET ORAL 2 TIMES DAILY
Status: DISCONTINUED | OUTPATIENT
Start: 2017-10-15 | End: 2017-10-16 | Stop reason: HOSPADM

## 2017-10-15 RX ORDER — LISINOPRIL 20 MG/1
20 TABLET ORAL DAILY
Status: DISCONTINUED | OUTPATIENT
Start: 2017-10-16 | End: 2017-10-15

## 2017-10-15 RX ORDER — LISINOPRIL 20 MG/1
20 TABLET ORAL DAILY
Status: DISCONTINUED | OUTPATIENT
Start: 2017-10-15 | End: 2017-10-16 | Stop reason: HOSPADM

## 2017-10-15 RX ORDER — LABETALOL 100 MG/1
100 TABLET, FILM COATED ORAL 2 TIMES DAILY
Status: DISCONTINUED | OUTPATIENT
Start: 2017-10-15 | End: 2017-10-15

## 2017-10-15 RX ORDER — CEPHALEXIN 500 MG/1
500 CAPSULE ORAL EVERY 8 HOURS
Status: DISCONTINUED | OUTPATIENT
Start: 2017-10-15 | End: 2017-10-15

## 2017-10-15 RX ADMIN — LABETALOL HYDROCHLORIDE 100 MG: 100 TABLET, FILM COATED ORAL at 07:10

## 2017-10-15 RX ADMIN — BUMETANIDE 1 MG: 1 TABLET ORAL at 07:10

## 2017-10-15 RX ADMIN — NITROGLYCERIN 0.4 MG: 0.4 TABLET SUBLINGUAL at 03:10

## 2017-10-15 RX ADMIN — TOPIRAMATE 50 MG: 25 TABLET, FILM COATED ORAL at 08:10

## 2017-10-15 RX ADMIN — ONDANSETRON HYDROCHLORIDE 8 MG: 2 INJECTION INTRAMUSCULAR; INTRAVENOUS at 03:10

## 2017-10-15 RX ADMIN — SODIUM CHLORIDE 1000 ML: 0.9 INJECTION, SOLUTION INTRAVENOUS at 03:10

## 2017-10-15 RX ADMIN — SIMVASTATIN 20 MG: 10 TABLET, FILM COATED ORAL at 08:10

## 2017-10-15 RX ADMIN — LEVETIRACETAM 1000 MG: 500 TABLET, FILM COATED ORAL at 08:10

## 2017-10-15 RX ADMIN — ASPIRIN 325 MG ORAL TABLET 325 MG: 325 PILL ORAL at 03:10

## 2017-10-15 RX ADMIN — PANTOPRAZOLE SODIUM 80 MG: 40 TABLET, DELAYED RELEASE ORAL at 03:10

## 2017-10-15 RX ADMIN — CEFTRIAXONE 1 G: 1 INJECTION, SOLUTION INTRAVENOUS at 07:10

## 2017-10-15 RX ADMIN — CARBAMAZEPINE 300 MG: 100 CAPSULE, EXTENDED RELEASE ORAL at 08:10

## 2017-10-15 RX ADMIN — FLUCONAZOLE 200 MG: 100 TABLET ORAL at 07:10

## 2017-10-15 RX ADMIN — ALUMINUM HYDROXIDE, MAGNESIUM HYDROXIDE, AND SIMETHICONE 30 ML: 200; 200; 20 SUSPENSION ORAL at 08:10

## 2017-10-15 RX ADMIN — GABAPENTIN 300 MG: 300 CAPSULE ORAL at 08:10

## 2017-10-15 RX ADMIN — ENOXAPARIN SODIUM 40 MG: 100 INJECTION SUBCUTANEOUS at 06:10

## 2017-10-15 RX ADMIN — ALUMINUM HYDROXIDE, MAGNESIUM HYDROXIDE, AND SIMETHICONE 60 ML: 200; 200; 20 SUSPENSION ORAL at 03:10

## 2017-10-15 RX ADMIN — OXYCODONE AND ACETAMINOPHEN 1 TABLET: 5; 325 TABLET ORAL at 07:10

## 2017-10-15 RX ADMIN — HYDROMORPHONE HYDROCHLORIDE 1 MG: 2 INJECTION, SOLUTION INTRAMUSCULAR; INTRAVENOUS; SUBCUTANEOUS at 03:10

## 2017-10-15 RX ADMIN — LISINOPRIL 20 MG: 20 TABLET ORAL at 07:10

## 2017-10-15 NOTE — ED NOTES
Patient presented to the ED via EMS with her mother stating that patient fell out at a [employer] gathering. Patient stated that she took her antibiotic and pyridium and hasn't eaten or drunken anything today. Patient stated that she didn't hit her head because she sat on the ground and that is all she can remember.

## 2017-10-15 NOTE — ASSESSMENT & PLAN NOTE
57 year old with Hypertension and family history of CAD presents with concerning story of radiation chest pressure to her L shoulder and neck with numbness in L fingers. Although she does have evidence of UTI with abnormal urinalysis and positive nitrites, I cannot exclude ACS. Her physical exam is non-focal; Chest pain is not reproducible at the bedside. Her initial troponin 1 level is negative, EKG non-ischemic=NSR. Will keep NPO after midnight and trend CE - JAMES score 1 (family HX)  Continuous cardiac monitoring  Continue to trend CE Q8 hrs X3 sets  ASA 81 mg PO Daily  Nitro SL prn CP  2D Echo  TSH, lipid panel  NPO after midnight  Consider Cardiac consult if troponin 1 levels trend upwards, otherwise, can defer to Cardiology in clinic

## 2017-10-15 NOTE — ASSESSMENT & PLAN NOTE
Abnormal urinalysis + nitrites dispite taking keflex -- failed OP therapy??  -Discontinue Keflex  -IV Rocephin X 1 dose  -Change ABX to augmentin for DRSP & ESBL coverage due to recent ABX use in am

## 2017-10-15 NOTE — HPI
"Yanique Thomson is a 57 y.o. 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 Patient re-presents today, after [facility] service, for evaluation of "feeling weak" like she was going to pass out, had a presyncopal episode, with associated, pressure-like chest pain that radiated to her L shoulder and neck with some numbness of her L fingers. Per patient the feeling was transient but has been intermittent X 2 days. Patient presented on Friday (2 days ago) for dysuria, urgency, and vaginal discharge, was RX'ed Keflex & pyridium which she started taking on Friday night, without improvement of symptoms. Patient denies recent fever, chills, changes in bowel functions, hormone use, recent sick contacts, or recent long trips. Additionally, patient reports headache.    Initial workup includes normal CXR, normal initial troponin 1 level, and findings of positive nitrite on urinalysis dispite taking Keflex; and otherwise, unimpressive basic labs.  "

## 2017-10-15 NOTE — ASSESSMENT & PLAN NOTE
BP elevated but patient has not had an opportunity to take her home medications today. She reports compliance however, she went to [employer] this morning and has not taken them. Continue home Bumex, labetalol, lisinopril  2D echo in am

## 2017-10-15 NOTE — NURSING
Report received from Yanique who received report from Rosa in ER.  Patient arrived from ER.  Patient is AAOx4. Patient is resting comfortably. Patient has no denies signs of SOB, difficulty breathing. Patient IV is clean dry intact no sign of redness.

## 2017-10-15 NOTE — SUBJECTIVE & OBJECTIVE
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    Seizures        Past Surgical History:   Procedure Laterality Date    APPENDECTOMY      CHOLECYSTECTOMY      HYSTERECTOMY      laser to vaginal      PARTIAL HYMENECTOMY      sweat glands Bilateral     removial        Review of patient's allergies indicates:  No Known Allergies    No current facility-administered medications on file prior to encounter.      Current Outpatient Prescriptions on File Prior to Encounter   Medication Sig    bumetanide (BUMEX) 1 MG tablet TK 1 T PO  QD    carbamazepine (CARBATROL) 300 MG CM12 Take 300 mg by mouth 3 (three) times daily.    cephALEXin (KEFLEX) 500 MG capsule Take 1 capsule (500 mg total) by mouth every 8 (eight) hours.    clotrimazole (LOTRIMIN) 1 % cream Apply to affected area 2 times daily    ESTRACE 0.01 % (0.1 mg/gram) vaginal cream PLACE 1 GRAM VAGINALLY 2 TIMES A WEEK    FLONASE ALLERGY RELIEF 50 mcg/actuation nasal spray SPRAY ONCE IEN QD    gabapentin (NEURONTIN) 300 MG capsule TK ONE C PO  QHS    labetalol (NORMODYNE) 100 MG tablet Take 100 mg by mouth 2 (two) times daily.    levetiracetam (KEPPRA) 1000 MG tablet Take 1,000 mg by mouth 2 (two) times daily.    lisinopril (PRINIVIL,ZESTRIL) 20 MG tablet Take 20 mg by mouth once daily.    omeprazole (PRILOSEC) 40 MG capsule TK 1 C PO QD    PANCREAZE 10,500-35,500- 61,500 unit CpDR TK 1 C PO TID    phenazopyridine (PYRIDIUM) 200 MG tablet Take 1 tablet (200 mg total) by mouth 3 (three) times daily.    simvastatin (ZOCOR) 20 MG tablet Take 20 mg by mouth every evening.    topiramate (TOPAMAX) 200 MG Tab Take 200 mg by mouth 2 (two) times daily.     aluminum-magnesium hydroxide-simethicone (MAALOX ADVANCED) 200-200-20 mg/5 mL Susp Take 30 mLs by mouth 4 (four) times daily before meals and nightly.     famotidine (PEPCID) 20 MG tablet Take 1 tablet (20 mg total) by mouth 2 (two) times daily.    ondansetron (ZOFRAN) 4 MG tablet Take 1 tablet (4 mg total) by mouth every 8 (eight) hours as needed for Nausea.    oxycodone-acetaminophen (PERCOCET) 5-325 mg per tablet Take 1 tablet by mouth every 4 (four) hours as needed for Pain.    topiramate (TOPAMAX) 50 MG tablet Take 50 mg by mouth 2 (two) times daily.     Family History     Problem Relation (Age of Onset)    Cancer Sister    Diabetes Sister    Hypertension Father, Mother        Social History Main Topics    Smoking status: Never Smoker    Smokeless tobacco: Never Used    Alcohol use 0.6 oz/week     1 Glasses of wine per week      Comment: occasional    Drug use: No    Sexual activity: Not Currently     Review of Systems   Constitutional: Negative for appetite change, chills, diaphoresis and fever.   HENT: Negative for congestion, hearing loss, sore throat, tinnitus and trouble swallowing.    Eyes: Negative for photophobia, discharge, itching and visual disturbance.   Respiratory: Negative for apnea, cough, wheezing and stridor.    Cardiovascular: Positive for chest pain. Negative for palpitations and leg swelling.   Gastrointestinal: Negative for abdominal distention, abdominal pain, blood in stool, constipation, diarrhea and nausea.   Endocrine: Negative for polydipsia, polyphagia and polyuria.   Genitourinary: Positive for dysuria. Negative for difficulty urinating, flank pain and frequency.   Musculoskeletal: Negative for arthralgias, joint swelling and neck stiffness.   Skin: Negative for color change, rash and wound.   Neurological: Positive for dizziness and weakness. Negative for tremors, seizures, light-headedness, numbness and headaches.   Hematological: Negative for adenopathy.   Psychiatric/Behavioral: Negative for hallucinations and self-injury.     Objective:     Vital Signs (Most Recent):  Temp: 98.1 °F (36.7 °C) (10/15/17 1357)  Pulse: 78  (10/15/17 1439)  Resp: 18 (10/15/17 1357)  BP: (!) 148/71 (10/15/17 1439)  SpO2: 98 % (10/15/17 1439) Vital Signs (24h Range):  Temp:  [98.1 °F (36.7 °C)] 98.1 °F (36.7 °C)  Pulse:  [78-84] 78  Resp:  [18] 18  SpO2:  [96 %-98 %] 98 %  BP: (145-158)/() 148/71     Weight: 90.7 kg (200 lb)  Body mass index is 32.28 kg/m².    Physical Exam   Constitutional: She appears well-developed and well-nourished. She is cooperative.   HENT:   Head: Normocephalic and atraumatic.   Eyes: Conjunctivae and lids are normal.   Neck: Full passive range of motion without pain. Neck supple. No JVD present. No edema present. No thyroid mass present.   Cardiovascular: S1 normal, S2 normal and intact distal pulses.    No murmur heard.  Abdominal: Soft. Bowel sounds are normal. She exhibits no distension and no abdominal bruit. There is no splenomegaly or hepatomegaly. There is no tenderness. There is no CVA tenderness.   Lymphadenopathy:     She has no cervical adenopathy.     She has no axillary adenopathy.   Neurological: She is alert. She has normal reflexes. She displays no tremor. She displays no seizure activity.   Skin: Skin is warm, dry and intact.   Psychiatric: She has a normal mood and affect. Her speech is normal. Thought content normal. Cognition and memory are normal.        Significant Labs:   BMP:   Recent Labs  Lab 10/15/17  1530   GLU 92      K 3.7      CO2 23   BUN 17   CREATININE 1.0   CALCIUM 9.5   MG 2.0     CBC:   Recent Labs  Lab 10/15/17  1530   WBC 6.43   HGB 12.6   HCT 38.2        Cardiac Markers: No results for input(s): CKMB, MYOGLOBIN, BNP, TROPISTAT in the last 48 hours.  Lipase: No results for input(s): LIPASE in the last 48 hours.  Lipid Panel: No results for input(s): CHOL, HDL, LDLCALC, TRIG, CHOLHDL in the last 48 hours.  Magnesium:   Recent Labs  Lab 10/15/17  1530   MG 2.0     Troponin:   Recent Labs  Lab 10/15/17  1530   TROPONINI <0.006     TSH: No results for input(s): TSH in  the last 4320 hours.  Urine Culture:   Recent Labs  Lab 10/13/17  2130   LABURIN No significant growth       Significant Imaging:   Imaging Results          X-Ray Chest AP Portable (Final result)  Result time 10/15/17 16:03:38    Final result by Chris Zimmer MD (10/15/17 16:03:38)                 Impression:       No acute intrathoracic process.          Electronically signed by: CHRIS ZIMMER MD  Date:     10/15/17  Time:    16:03              Narrative:    [id number]  Accession # [id number]      Study:  XR CHEST AP PORTABLE    Indication: chest pain.    Comparison: Chest radiograph from 12/03/2016.    Findings:     XR CHEST AP PORTABLE.    Cardiac silhouette is normal in appearance.  Pulmonary vasculature is within normal limits.    Lungs well-aerated.  No focal consolidation.   No pleural effusion.  Osseous structures demonstrate no significant abnormalities.

## 2017-10-15 NOTE — ED PROVIDER NOTES
Encounter Date: 10/15/2017    SCRIBE #1 NOTE: I, Maritza Vyas, am scribing for, and in the presence of,  Jeremi Castro MD. I have scribed the following portions of the note - Other sections scribed: ROS and HPI.       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 Patient notes she felt like she is going to pass out, so she sat on a chair. She notes she remembers people were talking to her. She notes she remembers the event. She denies any head trauma or injury. She is c/o headache to top of her head, left lateral neck pain, intermittent SOB since her syncopal episode. She also reports dysuria. She has been taking Keflex and Pyridium for her sx. She reports compliance with her prescribed seizure and BP medications. No exacerbating or alleviating factors are reported. No other reported medical complaints. No prior medical intervention for her current sx.      The history is provided by the patient. No  was used.     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    Seizures      Past Surgical History:   Procedure Laterality Date    APPENDECTOMY      CHOLECYSTECTOMY      HYSTERECTOMY      laser to vaginal      PARTIAL HYMENECTOMY      sweat glands Bilateral     removial      Family History   Problem Relation Age of Onset    Hypertension Father     Hypertension Mother     Diabetes Sister     Cancer Sister      Social History    Substance Use Topics    Smoking status: Never Smoker    Smokeless tobacco: Never Used    Alcohol use 0.6 oz/week     1 Glasses of wine per week      Comment: occasional     Review of Systems   Constitutional: Negative for chills and fever.   HENT: Negative for congestion, ear pain, rhinorrhea and sore throat.    Eyes: Negative for pain and visual disturbance.   Respiratory: Positive for shortness of breath (intermittent). Negative for cough.    Cardiovascular: Negative for chest pain.   Gastrointestinal: Negative for abdominal pain, diarrhea, nausea and vomiting.   Genitourinary: Positive for dysuria.   Musculoskeletal: Positive for neck pain (L, lateral). Negative for back pain.   Skin: Negative for rash.   Neurological: Positive for weakness (generalized) and headaches (top).        (+) presyncope       Physical Exam     Initial Vitals [10/15/17 1357]   BP Pulse Resp Temp SpO2   (!) 158/100 84 18 98.1 °F (36.7 °C) 96 %      MAP       119.33         Physical Exam  The patient was examined specifically for the following:   General:No significant distress, Good color, Warm and dry. Head and neck:Scalp atraumatic, Neck supple. Neurological:Appropriate conversation, Gross motor deficits. Eyes:Conjugate gaze, Clear corneas. ENT: No epistaxis. Cardiac: Regular rate and rhythm, Grossly normal heart tones. Pulmonary: Wheezing, Rales. Gastrointestinal: Abdominal tenderness, Abdominal distention. Musculoskeletal: Extremity deformity, Apparent pain with range of motion of the joints. Skin: Rash.   The findings on examination were normal.  The lungs are clear.  The heart tones are normal.  The abdomen is soft.  Extremities are nontender.  There is no pale range of motion of any joints.  The neck is supple.  Mental status examination, cranial nerves, motor and sensory examination are normal.  ED Course   Procedures  Labs Reviewed   COMPREHENSIVE METABOLIC PANEL - Abnormal; Notable for the following:        Result Value     Anion Gap 6 (*)     All other components within normal limits   CBC W/ AUTO DIFFERENTIAL - Abnormal; Notable for the following:     RBC 3.98 (*)     MCH 31.7 (*)     MPV 9.0 (*)     All other components within normal limits   URINALYSIS - Abnormal; Notable for the following:     Nitrite, UA Positive (*)     Urobilinogen, UA 4.0-6.0 (*)     All other components within normal limits   MAGNESIUM   TROPONIN I   APTT   PROTIME-INR   URINALYSIS MICROSCOPIC     EKG Readings: (Independently Interpreted)   This patient is in a normal sinus rhythm with a heart rate is 78.  The PA and QRS intervals are normal.  There is no evidence of acute myocardial infarction or malignant arrhythmia.  There is poor R-wave progression across precordium.  There is a prolonged QT interval.  There is poor R-wave progression across precordium.  There are nonspecific T-wave changes.  There are nonspecific ST segment changes.  There is no definite evidence of acute myocardial infarction.       X-Rays:   Independently Interpreted Readings:   Other Readings:  Chest x-ray fails to reveal pneumothorax pneumonia pleural effusion.    Medical decision making: This patient presented emergency room with an episode of syncope or near syncope.  The patient had chest pressure before and during and after the event.  The first set of cardiac markers is negative.  Laboratory evaluation is unremarkable.  The patient has a history of seizures.  There is no evidence of seizure.  I will admit this patient for short stay observation repeat cardiac markers.             Scribe Attestation:   Scribe #1: I performed the above scribed service and the documentation accurately describes the services I performed. I attest to the accuracy of the note.    Attending Attestation:           Physician Attestation for Scribe:  Physician Attestation Statement for Scribe #1: I, Jeremi Castro MD, reviewed documentation, as scribed by Maritza Vyas in my presence, and it is both accurate  and complete.                 ED Course      Clinical Impression:   The primary encounter diagnosis was Chest pain with moderate risk of acute coronary syndrome. Diagnoses of Syncope, unspecified syncope type, Headache on top of head, Acute cystitis without hematuria, History of seizure disorder, Hypertension, essential, Vaginal yeast infection, Nonintractable headache, unspecified chronicity pattern, unspecified headache type, and Vaginal dysplasia were also pertinent to this visit.                           Jeremi Castro MD  10/17/17 0023

## 2017-10-16 VITALS
WEIGHT: 214.94 LBS | DIASTOLIC BLOOD PRESSURE: 70 MMHG | TEMPERATURE: 98 F | OXYGEN SATURATION: 98 % | SYSTOLIC BLOOD PRESSURE: 139 MMHG | HEIGHT: 66 IN | BODY MASS INDEX: 34.55 KG/M2 | RESPIRATION RATE: 18 BRPM | HEART RATE: 59 BPM

## 2017-10-16 PROBLEM — R07.9 CHEST PAIN WITH MODERATE RISK OF ACUTE CORONARY SYNDROME: Status: RESOLVED | Noted: 2017-10-15 | Resolved: 2017-10-16

## 2017-10-16 LAB
AORTIC VALVE REGURGITATION: NORMAL
DIASTOLIC DYSFUNCTION: NO
GLOBAL PERICARDIAL EFFUSION: NORMAL
MITRAL VALVE MOBILITY: NORMAL
MITRAL VALVE REGURGITATION: NORMAL
RETIRED EF AND QEF - SEE NOTES: 60 (ref 55–65)
TROPONIN I SERPL DL<=0.01 NG/ML-MCNC: <0.006 NG/ML

## 2017-10-16 PROCEDURE — 84484 ASSAY OF TROPONIN QUANT: CPT

## 2017-10-16 PROCEDURE — 93306 TTE W/DOPPLER COMPLETE: CPT

## 2017-10-16 PROCEDURE — 25000003 PHARM REV CODE 250: Performed by: EMERGENCY MEDICINE

## 2017-10-16 PROCEDURE — 93306 TTE W/DOPPLER COMPLETE: CPT | Mod: 26,,, | Performed by: INTERNAL MEDICINE

## 2017-10-16 PROCEDURE — 90471 IMMUNIZATION ADMIN: CPT | Performed by: NURSE PRACTITIONER

## 2017-10-16 PROCEDURE — 63600175 PHARM REV CODE 636 W HCPCS: Performed by: PHYSICIAN ASSISTANT

## 2017-10-16 PROCEDURE — 96375 TX/PRO/DX INJ NEW DRUG ADDON: CPT

## 2017-10-16 PROCEDURE — 25000003 PHARM REV CODE 250: Performed by: PHYSICIAN ASSISTANT

## 2017-10-16 PROCEDURE — 36415 COLL VENOUS BLD VENIPUNCTURE: CPT

## 2017-10-16 PROCEDURE — 63600175 PHARM REV CODE 636 W HCPCS: Performed by: NURSE PRACTITIONER

## 2017-10-16 PROCEDURE — G0378 HOSPITAL OBSERVATION PER HR: HCPCS

## 2017-10-16 PROCEDURE — 96374 THER/PROPH/DIAG INJ IV PUSH: CPT

## 2017-10-16 PROCEDURE — 90686 IIV4 VACC NO PRSV 0.5 ML IM: CPT | Performed by: NURSE PRACTITIONER

## 2017-10-16 PROCEDURE — 25000003 PHARM REV CODE 250: Performed by: NURSE PRACTITIONER

## 2017-10-16 RX ORDER — AMOXICILLIN AND CLAVULANATE POTASSIUM 875; 125 MG/1; MG/1
1 TABLET, FILM COATED ORAL EVERY 12 HOURS
Qty: 14 TABLET | Refills: 0 | Status: SHIPPED | OUTPATIENT
Start: 2017-10-16 | End: 2017-10-23

## 2017-10-16 RX ORDER — DIPHENHYDRAMINE HYDROCHLORIDE 50 MG/ML
12.5 INJECTION INTRAMUSCULAR; INTRAVENOUS ONCE
Status: COMPLETED | OUTPATIENT
Start: 2017-10-16 | End: 2017-10-16

## 2017-10-16 RX ORDER — PROCHLORPERAZINE EDISYLATE 5 MG/ML
10 INJECTION INTRAMUSCULAR; INTRAVENOUS ONCE
Status: COMPLETED | OUTPATIENT
Start: 2017-10-16 | End: 2017-10-16

## 2017-10-16 RX ORDER — CARBAMAZEPINE 100 MG/1
300 TABLET, EXTENDED RELEASE ORAL 2 TIMES DAILY
Status: DISCONTINUED | OUTPATIENT
Start: 2017-10-16 | End: 2017-10-16 | Stop reason: HOSPADM

## 2017-10-16 RX ORDER — PHENAZOPYRIDINE HYDROCHLORIDE 100 MG/1
100 TABLET, FILM COATED ORAL
Status: DISCONTINUED | OUTPATIENT
Start: 2017-10-16 | End: 2017-10-16 | Stop reason: HOSPADM

## 2017-10-16 RX ORDER — KETOROLAC TROMETHAMINE 30 MG/ML
30 INJECTION, SOLUTION INTRAMUSCULAR; INTRAVENOUS ONCE
Status: COMPLETED | OUTPATIENT
Start: 2017-10-16 | End: 2017-10-16

## 2017-10-16 RX ADMIN — LABETALOL HYDROCHLORIDE 100 MG: 100 TABLET, FILM COATED ORAL at 09:10

## 2017-10-16 RX ADMIN — POLYETHYLENE GLYCOL 3350 17 G: 17 POWDER, FOR SOLUTION ORAL at 09:10

## 2017-10-16 RX ADMIN — OXYCODONE AND ACETAMINOPHEN 1 TABLET: 5; 325 TABLET ORAL at 09:10

## 2017-10-16 RX ADMIN — INFLUENZA A VIRUS A/MICHIGAN/45/2015 X-275 (H1N1) ANTIGEN (FORMALDEHYDE INACTIVATED), INFLUENZA A VIRUS A/HONG KONG/4801/2014 X-263B (H3N2) ANTIGEN (FORMALDEHYDE INACTIVATED), INFLUENZA B VIRUS B/PHUKET/3073/2013 ANTIGEN (FORMALDEHYDE INACTIVATED), AND INFLUENZA B VIRUS B/BRISBANE/60/2008 ANTIGEN (FORMALDEHYDE INACTIVATED) 0.5 ML: 15; 15; 15; 15 INJECTION, SUSPENSION INTRAMUSCULAR at 01:10

## 2017-10-16 RX ADMIN — LEVETIRACETAM 1000 MG: 500 TABLET, FILM COATED ORAL at 09:10

## 2017-10-16 RX ADMIN — BUMETANIDE 1 MG: 1 TABLET ORAL at 09:10

## 2017-10-16 RX ADMIN — GABAPENTIN 300 MG: 300 CAPSULE ORAL at 05:10

## 2017-10-16 RX ADMIN — AMOXICILLIN AND CLAVULANATE POTASSIUM 1 TABLET: 875; 125 TABLET, FILM COATED ORAL at 09:10

## 2017-10-16 RX ADMIN — PHENAZOPYRIDINE HYDROCHLORIDE 100 MG: 100 TABLET ORAL at 10:10

## 2017-10-16 RX ADMIN — TOPIRAMATE 50 MG: 25 TABLET, FILM COATED ORAL at 09:10

## 2017-10-16 RX ADMIN — KETOROLAC TROMETHAMINE 30 MG: 30 INJECTION, SOLUTION INTRAMUSCULAR at 11:10

## 2017-10-16 RX ADMIN — PANTOPRAZOLE SODIUM 40 MG: 40 TABLET, DELAYED RELEASE ORAL at 09:10

## 2017-10-16 RX ADMIN — DIPHENHYDRAMINE HYDROCHLORIDE 12.5 MG: 50 INJECTION, SOLUTION INTRAMUSCULAR; INTRAVENOUS at 11:10

## 2017-10-16 RX ADMIN — LISINOPRIL 20 MG: 20 TABLET ORAL at 09:10

## 2017-10-16 RX ADMIN — GABAPENTIN 300 MG: 300 CAPSULE ORAL at 01:10

## 2017-10-16 RX ADMIN — PROCHLORPERAZINE EDISYLATE 10 MG: 5 INJECTION INTRAMUSCULAR; INTRAVENOUS at 11:10

## 2017-10-16 RX ADMIN — OXYCODONE AND ACETAMINOPHEN 1 TABLET: 5; 325 TABLET ORAL at 03:10

## 2017-10-16 RX ADMIN — ASPIRIN 81 MG: 81 TABLET, COATED ORAL at 09:10

## 2017-10-16 RX ADMIN — CARBAMAZEPINE 300 MG: 100 TABLET, EXTENDED RELEASE ORAL at 09:10

## 2017-10-16 RX ADMIN — ALUMINUM HYDROXIDE, MAGNESIUM HYDROXIDE, AND SIMETHICONE 30 ML: 200; 200; 20 SUSPENSION ORAL at 11:10

## 2017-10-16 NOTE — DISCHARGE SUMMARY
"[facility] Hospital Medicine  Discharge Summary      Patient Name: Yanique Thomson  MRN: [id number]  Admission Date: 10/15/2017  Hospital Length of Stay: 0 days  Discharge Date and Time:  10/16/2017 2:31 PM  Attending Physician: Padilla Coleman MD   Discharging Provider: Shanthi Cruz PA-C  Primary Care Provider: Saud Leung MD      HPI:   Yanique Thomson is a 57 y.o. 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 Patient re-presents today, after [employer] service, for evaluation of "feeling weak" like she was going to pass out, had a presyncopal episode, with associated, pressure-like chest pain that radiated to her L shoulder and neck with some numbness of her L fingers. Per patient the feeling was transient but has been intermittent X 2 days. Patient presented on Friday (2 days ago) for dysuria, urgency, and vaginal discharge, was RX'ed Keflex & pyridium which she started taking on Friday night, without improvement of symptoms. Patient denies recent fever, chills, changes in bowel functions, hormone use, recent sick contacts, or recent long trips. Additionally, patient reports headache.    Initial workup includes normal CXR, normal initial troponin 1 level, and findings of positive nitrite on urinalysis dispite taking Keflex; and otherwise, unimpressive basic labs.    * No surgery found *      Indwelling Lines/Drains at time of discharge:   Lines/Drains/Airways          No matching active lines, drains, or airways        Hospital Course:   Patient presents for ACS r/o with CP. Negative initial trop and non ischemic EKG. She is with complaint of L neck and shoulder pain with tingling to L fingers. Serial troponins negative. 2D echo revealing EF 60% and no DD. Patient endorses awaiting appointment with [facility] for MRI to eval "spine issue" for which she cannot further detail--may be some component of cervical " radiculopathy vs MSK etiology? Advised toward NSAIDs and ICE to area and keep follow up. Of note patient also with UTI for which she was being treated for with keflex as outpatient but UA here continues to reveal nitrite positive UTI--she is also with yeast infection and received diflucan (1 dose on Friday per patient and another here in ED on presentation). As continued positive UA despite keflex will switch to Augmentin on discharge and further adjustments pending culture identification and susceptibility. Remained stable throughout stay and will discharge to home.      Consults:     Significant Diagnostic Studies: Labs:   CMP   Recent Labs  Lab 10/15/17  1530      K 3.7      CO2 23   GLU 92   BUN 17   CREATININE 1.0   CALCIUM 9.5   PROT 7.9   ALBUMIN 3.9   BILITOT 0.4   ALKPHOS 68   AST 20   ALT 14   ANIONGAP 6*   ESTGFRAFRICA >60   EGFRNONAA >60   , CBC   Recent Labs  Lab 10/15/17  1530   WBC 6.43   HGB 12.6   HCT 38.2       and Troponin   Recent Labs  Lab 10/16/17  0259   TROPONINI <0.006     Cardiac Graphics: Echocardiogram:   2D echo with color flow doppler:   Results for orders placed or performed during the hospital encounter of 10/15/17   2D echo with color flow doppler   Result Value Ref Range    EF 60 55 - 65    Mitral Valve Regurgitation TRIVIAL     Diastolic Dysfunction No     Aortic Valve Regurgitation TRIVIAL     Pericardial Effusion NONE     Mitral Valve Mobility NORMAL        Pending Diagnostic Studies:     None        Final Active Diagnoses:    Diagnosis Date Noted POA    Acute cystitis without hematuria [N30.00] 10/15/2017 Yes    History of seizure disorder [Z86.69] 10/15/2017 Not Applicable    Hypertension, essential [I10] 10/15/2017 Yes    Vaginal yeast infection [B37.3] 10/15/2017 Yes    Headache [R51] 10/15/2017 Yes      Problems Resolved During this Admission:    Diagnosis Date Noted Date Resolved POA    PRINCIPAL PROBLEM:  Chest pain with moderate risk of acute  coronary syndrome [R07.9] 10/15/2017 10/16/2017 Yes      No new Assessment & Plan notes have been filed under this hospital service since the last note was generated.  Service: Hospital Medicine      Discharged Condition: good    Disposition: Home or Self Care    Follow Up:  Follow-up Information     Saud Leung MD On 10/17/2017.    Specialties:  Internal Medicine, Family Medicine  Why:  Outpatient Services PCP Follow-up Tuesday at 10:45 AM.  Contact information:  [address]  [address]  [place]  [phone]                 Patient Instructions:     Diet general   Order Specific Question Answer Comments   Additional restrictions: Cardiac (Low Na/Chol)      Activity as tolerated       Medications:  Reconciled Home Medications:   Discharge Medication List as of 10/16/2017  1:55 PM      START taking these medications    Details   amoxicillin-clavulanate 875-125mg (AUGMENTIN) 875-125 mg per tablet Take 1 tablet by mouth every 12 (twelve) hours., Starting Mon 10/16/2017, Until Mon 10/23/2017, Normal         CONTINUE these medications which have NOT CHANGED    Details   aluminum-magnesium hydroxide-simethicone (MAALOX ADVANCED) 200-200-20 mg/5 mL Susp Take 30 mLs by mouth 4 (four) times daily before meals and nightly., Starting 12/3/2016, Until Sun 12/3/17, Print      bumetanide (BUMEX) 1 MG tablet TK 1 T PO  QD, Historical Med      carbamazepine (CARBATROL) 300 MG CM12 Take 300 mg by mouth 3 (three) times daily., Until Discontinued, Historical Med      clotrimazole (LOTRIMIN) 1 % cream Apply to affected area 2 times daily, Print      ESTRACE 0.01 % (0.1 mg/gram) vaginal cream PLACE 1 GRAM VAGINALLY 2 TIMES A WEEK, Normal      famotidine (PEPCID) 20 MG tablet Take 1 tablet (20 mg total) by mouth 2 (two) times daily., Starting 12/3/2016, Until Sun 12/3/17, Print      FLONASE ALLERGY RELIEF 50 mcg/actuation nasal spray SPRAY ONCE IEN QD, Historical Med      gabapentin (NEURONTIN) 300 MG capsule TK ONE  C PO  QHS, Historical Med      labetalol (NORMODYNE) 100 MG tablet Take 100 mg by mouth 2 (two) times daily., Until Discontinued, Historical Med      levetiracetam (KEPPRA) 1000 MG tablet Take 1,000 mg by mouth 2 (two) times daily., Until Discontinued, Historical Med      lisinopril (PRINIVIL,ZESTRIL) 20 MG tablet Take 20 mg by mouth once daily., Until Discontinued, Historical Med      omeprazole (PRILOSEC) 40 MG capsule TK 1 C PO QD, Historical Med      ondansetron (ZOFRAN) 4 MG tablet Take 1 tablet (4 mg total) by mouth every 8 (eight) hours as needed for Nausea., Starting 12/3/2016, Until Discontinued, Print      oxycodone-acetaminophen (PERCOCET) 5-325 mg per tablet Take 1 tablet by mouth every 4 (four) hours as needed for Pain., Starting 7/8/2016, Until Discontinued, Print      PANCREAZE 10,500-35,500- 61,500 unit CpDR TK 1 C PO TID, Historical Med      phenazopyridine (PYRIDIUM) 200 MG tablet Take 1 tablet (200 mg total) by mouth 3 (three) times daily., Starting Fri 10/13/2017, Until Mon 10/23/2017, Print      simvastatin (ZOCOR) 20 MG tablet Take 20 mg by mouth every evening., Until Discontinued, Historical Med      !! topiramate (TOPAMAX) 200 MG Tab Take 200 mg by mouth 2 (two) times daily. , Until Discontinued, Historical Med      !! topiramate (TOPAMAX) 50 MG tablet Take 50 mg by mouth 2 (two) times daily., Until Discontinued, Historical Med       !! - Potential duplicate medications found. Please discuss with provider.      STOP taking these medications       cephALEXin (KEFLEX) 500 MG capsule Comments:   Reason for Stopping:             Time spent on the discharge of patient: less than thirty minutes      Shanthi Cruz PA-C  Hospitalist-Department of Hospital Medicine  [address] [place] [address]  Office [phone] Pager [phone]

## 2017-10-16 NOTE — H&P
"[facility] Medicine  History & Physical    Patient Name: Yanique Thomson  MRN: [id number]  Admission Date: 10/15/2017  Attending Physician: Padilla Coleman MD   Primary Care Provider: Saud Leung MD         Patient information was obtained from patient and ER records.     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 Patient re-presents today, after [employer] service, for evaluation of "feeling weak" like she was going to pass out, had a presyncopal episode, with associated, pressure-like chest pain that radiated to her L shoulder and neck with some numbness of her L fingers. Per patient the feeling was transient but has been intermittent X 2 days. Patient presented on Friday (2 days ago) for dysuria, urgency, and vaginal discharge, was RX'ed Keflex & pyridium which she started taking on Friday night, without improvement of symptoms. Patient denies recent fever, chills, changes in bowel functions, hormone use, recent sick contacts, or recent long trips. Additionally, patient reports headache.    Initial workup includes normal CXR, normal initial troponin 1 level, and findings of positive nitrite on urinalysis dispite taking Keflex; and otherwise, unimpressive basic labs.    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    Seizures        Past Surgical History:   Procedure Laterality Date    APPENDECTOMY      CHOLECYSTECTOMY      HYSTERECTOMY      laser to vaginal   "    PARTIAL HYMENECTOMY      sweat glands Bilateral     removial        Review of patient's allergies indicates:  No Known Allergies    No current facility-administered medications on file prior to encounter.      Current Outpatient Prescriptions on File Prior to Encounter   Medication Sig    bumetanide (BUMEX) 1 MG tablet TK 1 T PO  QD    carbamazepine (CARBATROL) 300 MG CM12 Take 300 mg by mouth 3 (three) times daily.    cephALEXin (KEFLEX) 500 MG capsule Take 1 capsule (500 mg total) by mouth every 8 (eight) hours.    clotrimazole (LOTRIMIN) 1 % cream Apply to affected area 2 times daily    ESTRACE 0.01 % (0.1 mg/gram) vaginal cream PLACE 1 GRAM VAGINALLY 2 TIMES A WEEK    FLONASE ALLERGY RELIEF 50 mcg/actuation nasal spray SPRAY ONCE IEN QD    gabapentin (NEURONTIN) 300 MG capsule TK ONE C PO  QHS    labetalol (NORMODYNE) 100 MG tablet Take 100 mg by mouth 2 (two) times daily.    levetiracetam (KEPPRA) 1000 MG tablet Take 1,000 mg by mouth 2 (two) times daily.    lisinopril (PRINIVIL,ZESTRIL) 20 MG tablet Take 20 mg by mouth once daily.    omeprazole (PRILOSEC) 40 MG capsule TK 1 C PO QD    PANCREAZE 10,500-35,500- 61,500 unit CpDR TK 1 C PO TID    phenazopyridine (PYRIDIUM) 200 MG tablet Take 1 tablet (200 mg total) by mouth 3 (three) times daily.    simvastatin (ZOCOR) 20 MG tablet Take 20 mg by mouth every evening.    topiramate (TOPAMAX) 200 MG Tab Take 200 mg by mouth 2 (two) times daily.     aluminum-magnesium hydroxide-simethicone (MAALOX ADVANCED) 200-200-20 mg/5 mL Susp Take 30 mLs by mouth 4 (four) times daily before meals and nightly.    famotidine (PEPCID) 20 MG tablet Take 1 tablet (20 mg total) by mouth 2 (two) times daily.    ondansetron (ZOFRAN) 4 MG tablet Take 1 tablet (4 mg total) by mouth every 8 (eight) hours as needed for Nausea.    oxycodone-acetaminophen (PERCOCET) 5-325 mg per tablet Take 1 tablet by mouth every 4 (four) hours as needed for Pain.    topiramate  (TOPAMAX) 50 MG tablet Take 50 mg by mouth 2 (two) times daily.     Family History     Problem Relation (Age of Onset)    Cancer Sister    Diabetes Sister    Hypertension Father, Mother        Social History Main Topics    Smoking status: Never Smoker    Smokeless tobacco: Never Used    Alcohol use 0.6 oz/week     1 Glasses of wine per week      Comment: occasional    Drug use: No    Sexual activity: Not Currently     Review of Systems   Constitutional: Negative for appetite change, chills, diaphoresis and fever.   HENT: Negative for congestion, hearing loss, sore throat, tinnitus and trouble swallowing.    Eyes: Negative for photophobia, discharge, itching and visual disturbance.   Respiratory: Negative for apnea, cough, wheezing and stridor.    Cardiovascular: Positive for chest pain. Negative for palpitations and leg swelling.   Gastrointestinal: Negative for abdominal distention, abdominal pain, blood in stool, constipation, diarrhea and nausea.   Endocrine: Negative for polydipsia, polyphagia and polyuria.   Genitourinary: Positive for dysuria. Negative for difficulty urinating, flank pain and frequency.   Musculoskeletal: Negative for arthralgias, joint swelling and neck stiffness.   Skin: Negative for color change, rash and wound.   Neurological: Positive for dizziness and weakness. Negative for tremors, seizures, light-headedness, numbness and headaches.   Hematological: Negative for adenopathy.   Psychiatric/Behavioral: Negative for hallucinations and self-injury.     Objective:     Vital Signs (Most Recent):  Temp: 98.1 °F (36.7 °C) (10/15/17 1357)  Pulse: 78 (10/15/17 1439)  Resp: 18 (10/15/17 1357)  BP: (!) 148/71 (10/15/17 1439)  SpO2: 98 % (10/15/17 1439) Vital Signs (24h Range):  Temp:  [98.1 °F (36.7 °C)] 98.1 °F (36.7 °C)  Pulse:  [78-84] 78  Resp:  [18] 18  SpO2:  [96 %-98 %] 98 %  BP: (145-158)/() 148/71     Weight: 90.7 kg (200 lb)  Body mass index is 32.28 kg/m².    Physical Exam    Constitutional: She appears well-developed and well-nourished. She is cooperative.   HENT:   Head: Normocephalic and atraumatic.   Eyes: Conjunctivae and lids are normal.   Neck: Full passive range of motion without pain. Neck supple. No JVD present. No edema present. No thyroid mass present.   Cardiovascular: S1 normal, S2 normal and intact distal pulses.    No murmur heard.  Abdominal: Soft. Bowel sounds are normal. She exhibits no distension and no abdominal bruit. There is no splenomegaly or hepatomegaly. There is no tenderness. There is no CVA tenderness.   Lymphadenopathy:     She has no cervical adenopathy.     She has no axillary adenopathy.   Neurological: She is alert. She has normal reflexes. She displays no tremor. She displays no seizure activity.   Skin: Skin is warm, dry and intact.   Psychiatric: She has a normal mood and affect. Her speech is normal. Thought content normal. Cognition and memory are normal.        Significant Labs:   BMP:   Recent Labs  Lab 10/15/17  1530   GLU 92      K 3.7      CO2 23   BUN 17   CREATININE 1.0   CALCIUM 9.5   MG 2.0     CBC:   Recent Labs  Lab 10/15/17  1530   WBC 6.43   HGB 12.6   HCT 38.2        Cardiac Markers: No results for input(s): CKMB, MYOGLOBIN, BNP, TROPISTAT in the last 48 hours.  Lipase: No results for input(s): LIPASE in the last 48 hours.  Lipid Panel: No results for input(s): CHOL, HDL, LDLCALC, TRIG, CHOLHDL in the last 48 hours.  Magnesium:   Recent Labs  Lab 10/15/17  1530   MG 2.0     Troponin:   Recent Labs  Lab 10/15/17  1530   TROPONINI <0.006     TSH: No results for input(s): TSH in the last 4320 hours.  Urine Culture:   Recent Labs  Lab 10/13/17  2130   LABURIN No significant growth       Significant Imaging:   Imaging Results          X-Ray Chest AP Portable (Final result)  Result time 10/15/17 16:03:38    Final result by Chris Zimmer MD (10/15/17 16:03:38)                 Impression:       No acute  intrathoracic process.          Electronically signed by: PATRICIO KELLOGG MD  Date:     10/15/17  Time:    16:03              Narrative:    [id number]  Accession # [id number]      Study:  XR CHEST AP PORTABLE    Indication: chest pain.    Comparison: Chest radiograph from 12/03/2016.    Findings:     XR CHEST AP PORTABLE.    Cardiac silhouette is normal in appearance.  Pulmonary vasculature is within normal limits.    Lungs well-aerated.  No focal consolidation.   No pleural effusion.  Osseous structures demonstrate no significant abnormalities.                                Assessment/Plan:     * Chest pain with moderate risk of acute coronary syndrome    57 year old with Hypertension and family history of CAD presents with concerning story of radiation chest pressure to her L shoulder and neck with numbness in L fingers. Although she does have evidence of UTI with abnormal urinalysis and positive nitrites, I cannot exclude ACS. Her physical exam is non-focal; Chest pain is not reproducible at the bedside. Her initial troponin 1 level is negative, EKG non-ischemic=NSR. Will keep NPO after midnight and trend CE - JAMES score 1 (family HX)  Continuous cardiac monitoring  Continue to trend CE Q8 hrs X3 sets  ASA 81 mg PO Daily  Nitro SL prn CP  2D Echo  TSH, lipid panel  NPO after midnight  Consider Cardiac consult if troponin 1 levels trend upwards, otherwise, can defer to Cardiology in clinic                Acute cystitis without hematuria    Abnormal urinalysis + nitrites dispite taking keflex -- failed OP therapy??  -Discontinue Keflex  -IV Rocephin X 1 dose  -Change ABX to augmentin for DRSP & ESBL coverage due to recent ABX use in am          Hypertension, essential    BP elevated but patient has not had an opportunity to take her home medications today. She reports compliance however, she went to [employer] this morning and has not taken them. Continue home Bumex, labetalol, lisinopril  2D echo in am        History of  seizure disorder    No evidence of seizures - continue home dose of keppra & carbamazepine        Headache    Attempt to abort with high dose NSAIDS          Vaginal yeast infection    diflucan 200 mg X 1 dose            VTE Risk Mitigation         Ordered     enoxaparin injection 40 mg  Daily     Route:  Subcutaneous        10/15/17 1812     Medium Risk of VTE  Once      10/15/17 1812               JOANA Lacey, FNP-C  PA# [id number]  NPI# [id number]  Hospitalist - Department of Hospital Medicine  [address] [place] [address]  Office [phone]; Pager [phone]

## 2017-10-16 NOTE — PLAN OF CARE
10/16/17 1410   Final Note   Assessment Type Final Discharge Note   What phone number can be called within the next 1-3 days to see how you are doing after discharge? (Listed in chart.)   Hospital Follow Up  Appt(s) scheduled? Yes   Discharge plans and expectations educations in teach back method with documentation complete? Yes   Right Care Referral Info   Post Acute Recommendation No Care

## 2017-10-16 NOTE — PROGRESS NOTES
TN contacted Dr. Leung's office at [phone] to schedule PCP and cardiology f/u; spoke with Yanique who scheduled an appt for 10/17/17 at 10:45 AM.

## 2017-10-16 NOTE — PROGRESS NOTES
Discharge orders received. Patient AAO x 4. Currently denies pain, nausea, or SOB on RA. IV discontinued without complaint, catheter intact. Telemetry monitoring discontinued. Discharge instructions explained and given to patient. Family at bedside. Emotional support provided.

## 2017-10-16 NOTE — PLAN OF CARE
10/16/17 1411   Discharge Assessment   Assessment Type Discharge Planning Assessment   Confirmed/corrected address and phone number on facesheet? No   Assessment information obtained from? Patient   Communicated expected length of stay with patient/caregiver no   Prior to hospitilization cognitive status: Alert/Oriented   Prior to hospitalization functional status: Independent   Current cognitive status: Alert/Oriented   Current Functional Status: Independent   Lives With alone   Able to Return to Prior Arrangements yes   Is patient able to care for self after discharge? Yes   Readmission Within The Last 30 Days no previous admission in last 30 days   Patient currently being followed by outpatient case management? No   Patient currently receives any other outside agency services? No   Equipment Currently Used at Home none   Do you have any problems affording any of your prescribed medications? No   Is the patient taking medications as prescribed? yes   Does the patient have transportation home? Yes   Does the patient receive services at the [facility]? No   Discharge Plan A Home   Patient/Family In Agreement With Plan yes

## 2017-10-16 NOTE — HOSPITAL COURSE
"Patient presents for ACS r/o with CP. Negative initial trop and non ischemic EKG. She is with complaint of L neck and shoulder pain with tingling to L fingers. Serial troponins negative. 2D echo revealing EF 60% and no DD. Patient endorses awaiting appointment with [facility] for MRI to eval "spine issue" for which she cannot further detail--may be some component of cervical radiculopathy vs MSK etiology? Advised toward NSAIDs and ICE to area and keep follow up. Of note patient also with UTI for which she was being treated for with keflex as outpatient but UA here continues to reveal nitrite positive UTI--she is also with yeast infection and received diflucan (1 dose on Friday per patient and another here in ED on presentation). As continued positive UA despite keflex will switch to Augmentin on discharge and further adjustments pending culture identification and susceptibility. Remained stable throughout stay and will discharge to home.   "

## 2017-10-16 NOTE — PROGRESS NOTES
Follow-up Information     Saud Leung MD On 10/17/2017.    Specialties:  Internal Medicine, Family Medicine  Why:  Outpatient Services PCP Follow-up Tuesday at 10:45 AM.  Contact information:  Trudy KING  [phone]               PLEASE BRING TO ALL FOLLOW UP APPOINTMENTS:   A COPY YOUR DISCHARGE INSTRUCTIONS, Any new MEDICINES YOU ARE CURRENTLY TAKING IN THEIR ORIGINAL BOTTLES  And IDENTIFICATION AND INSURANCE CARD     **PLEASE ARRIVE 15 MINUTES AHEAD OF SCHEDULED APPOINTMENT TIME   ++PLEASE CALL 24 HOURS IN ADVANCE IF YOU MUST RESCHEDULE YOUR APPOINTMENT DAY AND/OR TIME     [facility]    WRITTEN HEALTHCARE AND DISCHARGE INFORMATION                        Help at Home           [phone]  After discharge for assistance [facility] On Call Nurse Care Line 24/7  Assistance    Things You are responsible For To Manage Your Care At Home:  1.    Getting your prescriptions filled   2.    Taking your medications as directed, DO NOT MISS ANY DOSES!  3.    Going to your follow-up doctor appointment. This is important because it  allow the doctor to monitor your progress and determine if  any changes need to made to your treatment plan.     Thank you for choosing [facility] for your care.  Please answer any calls you may receive from [facility] we want to continue to support you as you manage your healthcare needs. [facility] is happy to have the opportunity to serve you.     Sincerely,  Your [facility] Team,  Citlali LU; Transition Navigator [phone]

## 2017-10-16 NOTE — PROGRESS NOTES
"Patient / Family provided with educational information on Chest Pain.  Information reviewed and placed in :My Healthcare Packet" to be brought home for patient / family  to use as resource after discharge.  Information included:  signs and symptoms to look for and call the doctor if experiencing, and symptoms that may indicate a medical emergency: CALL 911.    Reminded patient things they will be responsible for to manage healthcare at home: getting Rx filled, attending follow up appointments, and taking medication as prescribed were discussed.   Teach back method used.  All questions answered.  Patient verbalized understanding of all information.      TN informed floor nurseLizzy, care management is complete and can proceed with discharge.    "

## 2017-10-17 LAB — BACTERIA UR CULT: NORMAL

## 2017-12-29 ENCOUNTER — HOSPITAL ENCOUNTER (EMERGENCY)
Facility: HOSPITAL | Age: 57
Discharge: HOME | End: 2017-12-29
Attending: EMERGENCY MEDICINE
Payer: MEDICAID

## 2017-12-29 VITALS
BODY MASS INDEX: 31.71 KG/M2 | SYSTOLIC BLOOD PRESSURE: 133 MMHG | OXYGEN SATURATION: 97 % | HEART RATE: 80 BPM | HEIGHT: 67 IN | TEMPERATURE: 98 F | DIASTOLIC BLOOD PRESSURE: 80 MMHG | RESPIRATION RATE: 18 BRPM | WEIGHT: 202 LBS

## 2017-12-29 DIAGNOSIS — L73.2 HIDRADENITIS SUPPURATIVA OF RIGHT AXILLA: Primary | ICD-10-CM

## 2017-12-29 PROCEDURE — 25000003 PHARM REV CODE 250: Performed by: PHYSICIAN ASSISTANT

## 2017-12-29 PROCEDURE — 99283 EMERGENCY DEPT VISIT LOW MDM: CPT | Mod: 25

## 2017-12-29 PROCEDURE — 10061 I&D ABSCESS COMP/MULTIPLE: CPT

## 2017-12-29 RX ORDER — LIDOCAINE HYDROCHLORIDE 10 MG/ML
10 INJECTION INFILTRATION; PERINEURAL
Status: COMPLETED | OUTPATIENT
Start: 2017-12-29 | End: 2017-12-29

## 2017-12-29 RX ORDER — OXYCODONE AND ACETAMINOPHEN 10; 325 MG/1; MG/1
1 TABLET ORAL
Status: COMPLETED | OUTPATIENT
Start: 2017-12-29 | End: 2017-12-29

## 2017-12-29 RX ORDER — OXYCODONE AND ACETAMINOPHEN 5; 325 MG/1; MG/1
1 TABLET ORAL EVERY 4 HOURS PRN
Qty: 15 TABLET | Refills: 0 | Status: SHIPPED | OUTPATIENT
Start: 2017-12-29 | End: 2018-09-13

## 2017-12-29 RX ORDER — SULFAMETHOXAZOLE AND TRIMETHOPRIM 800; 160 MG/1; MG/1
1 TABLET ORAL 2 TIMES DAILY
Qty: 14 TABLET | Refills: 0 | Status: SHIPPED | OUTPATIENT
Start: 2017-12-29 | End: 2018-01-05

## 2017-12-29 RX ADMIN — LIDOCAINE HYDROCHLORIDE 10 ML: 10 INJECTION, SOLUTION INFILTRATION; PERINEURAL at 10:12

## 2017-12-29 RX ADMIN — OXYCODONE HYDROCHLORIDE AND ACETAMINOPHEN 1 TABLET: 10; 325 TABLET ORAL at 10:12

## 2017-12-30 NOTE — ED PROVIDER NOTES
Encounter Date: 12/29/2017    SCRIBE #1 NOTE: IJulianna am scribing for, and in the presence of,  Remedios Avina PA-C. I have scribed the following portions of the note - Other sections scribed: HPI, ROS.       History     Chief Complaint   Patient presents with    Abscess     boil under right arm since Tuesday; has been using warm compresses without relief;      CC: Abscess    56 y/o female with abscess, epilepsy, GERD, HTN, moderate vaginal dysplasia, and seizures presents to the ED c/o 4 day hx of acute onset abscess to R sided axilla. The pain is severe (10/10); palpation exacerbates the pain. The patient reports similar symptoms in the past where she had I&Ds done. The patient attempted hot compresses with no relief. The patient states that when she was a teenager she had surgeries under her bilateral axilla to prevent recurrent abscesses, so she's unsure why she suddenly has another abscess. The patient denies fever, chills, or cough. No other symptoms reported.      The history is provided by the patient. No  was used.     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    Seizures      Past Surgical History:   Procedure Laterality Date    APPENDECTOMY      CHOLECYSTECTOMY      HYSTERECTOMY      laser to vaginal      PARTIAL HYMENECTOMY      sweat glands Bilateral     removial      Family History   Problem Relation Age of Onset    Hypertension Father     Hypertension Mother     Diabetes Sister     Cancer Sister      Social History   Substance Use Topics    Smoking status: Never Smoker    Smokeless tobacco: Never Used    Alcohol use 0.6 oz/week     1 Glasses of wine per week      Comment: occasional     Review of Systems   Constitutional: Negative for chills  and fever.   HENT: Negative for rhinorrhea.    Eyes: Negative for redness.   Respiratory: Negative for cough and shortness of breath.    Cardiovascular: Negative for chest pain.   Gastrointestinal: Negative for abdominal pain, diarrhea, nausea and vomiting.   Genitourinary: Negative for difficulty urinating and dysuria.   Musculoskeletal: Negative for back pain.        (-) arm or leg trouble   Skin: Negative for rash.        (+) abscess to R sided axilla   Neurological: Negative for headaches.       Physical Exam     Initial Vitals [12/29/17 2024]   BP Pulse Resp Temp SpO2   136/88 90 16 98.5 °F (36.9 °C) 96 %      MAP       104         Physical Exam    Constitutional: She appears well-developed and well-nourished.   Cardiovascular: Normal rate, regular rhythm, normal heart sounds and intact distal pulses.   Skin: Skin is warm. Abscess (2cm abscess to right axilla) noted.         ED Course   I & D - Incision and Drainage  Date/Time: 1/16/2018 9:27 AM  Performed by: TAYLOR SULLIVAN  Authorized by: GISELA JACINTO   Consent Done: Emergent Situation  Type: abscess  Location: rigth axilla.  Anesthesia: local infiltration    Anesthesia:  Local Anesthetic: lidocaine 1% without epinephrine  Anesthetic total: 4 mL  Patient sedated: no  Scalpel size: 11  Incision type: single straight  Complexity: complex  Drainage: pus  Drainage amount: moderate  Wound treatment: incision  Packing material: 1/4 in gauze  Complications: No  Specimens: No  Patient tolerance: Patient tolerated the procedure well with no immediate complications        Labs Reviewed - No data to display          Medical Decision Making:   Initial Assessment:   Pt has simple soft tissue abscess to right axilla. Wound incised and drained. I do not suspect cellulitis, soft tissue mass, or sepsis. Will d/c home with bactrim and percocet.            Scribe Attestation:   Scribe #1: I performed the above scribed service and the documentation accurately describes  the services I performed. I attest to the accuracy of the note.    Attending Attestation:           Physician Attestation for Scribe:  Physician Attestation Statement for Scribe #1: I, Remedios Avina PA-C, reviewed documentation, as scribed by Julianna Dale in my presence, and it is both accurate and complete.                 ED Course      Clinical Impression:   The encounter diagnosis was Hidradenitis suppurativa of right axilla.                           GEOVANI Robles  01/16/18 0928

## 2018-02-08 ENCOUNTER — TELEPHONE (OUTPATIENT)
Dept: GYNECOLOGIC ONCOLOGY | Facility: CLINIC | Age: 58
End: 2018-02-08

## 2018-02-21 ENCOUNTER — TELEPHONE (OUTPATIENT)
Dept: GYNECOLOGIC ONCOLOGY | Facility: CLINIC | Age: 58
End: 2018-02-21

## 2018-02-21 NOTE — TELEPHONE ENCOUNTER
----- Message from Koby George sent at 2/21/2018  2:14 PM CST -----  Contact: Pt  X_ 1st Request  _ 2nd Request  _ 3rd Request    Who: MARGARET HESS [[id number]]    Why: Pt would like to have a confirmation that her abnormal test results were received and ready for her appt on 3/6    What Number to Call Back: [phone]    When to Expect a call back: (Before the end of the day)  -- if call after 3:00 call back will be tomorrow.

## 2018-03-06 ENCOUNTER — OFFICE VISIT (OUTPATIENT)
Dept: GYNECOLOGIC ONCOLOGY | Facility: CLINIC | Age: 58
End: 2018-03-06
Payer: MEDICAID

## 2018-03-06 VITALS — BODY MASS INDEX: 31.84 KG/M2 | WEIGHT: 203.25 LBS

## 2018-03-06 DIAGNOSIS — N95.2 VAGINAL ATROPHY: ICD-10-CM

## 2018-03-06 DIAGNOSIS — N89.3 VAGINAL DYSPLASIA: ICD-10-CM

## 2018-03-06 PROCEDURE — 99213 OFFICE O/P EST LOW 20 MIN: CPT | Mod: S$PBB,,, | Performed by: OBSTETRICS & GYNECOLOGY

## 2018-03-06 PROCEDURE — 99212 OFFICE O/P EST SF 10 MIN: CPT | Mod: PBBFAC | Performed by: OBSTETRICS & GYNECOLOGY

## 2018-03-06 PROCEDURE — 99999 PR PBB SHADOW E&M-EST. PATIENT-LVL II: CPT | Mod: PBBFAC,,, | Performed by: OBSTETRICS & GYNECOLOGY

## 2018-03-06 RX ORDER — ESTRADIOL 0.1 MG/G
CREAM VAGINAL
Qty: 42.5 G | Refills: 1 | Status: SHIPPED | OUTPATIENT
Start: 2018-03-06 | End: 2018-05-04 | Stop reason: SDUPTHER

## 2018-03-06 RX ORDER — MUPIROCIN 20 MG/G
OINTMENT TOPICAL
Refills: 0 | COMMUNITY
Start: 2018-01-16 | End: 2018-08-25

## 2018-03-06 RX ORDER — LEVOFLOXACIN 500 MG/1
TABLET, FILM COATED ORAL
Refills: 0 | COMMUNITY
Start: 2017-12-06 | End: 2018-08-25

## 2018-03-06 RX ORDER — PHENTERMINE HYDROCHLORIDE 37.5 MG/1
37.5 TABLET ORAL DAILY
Refills: 0 | COMMUNITY
Start: 2018-01-08 | End: 2018-08-25

## 2018-03-06 RX ORDER — METRONIDAZOLE 7.5 MG/G
GEL VAGINAL
Refills: 0 | COMMUNITY
Start: 2018-01-30 | End: 2018-08-25

## 2018-03-06 RX ORDER — BENZONATATE 100 MG/1
CAPSULE ORAL
Refills: 0 | COMMUNITY
Start: 2017-12-06 | End: 2018-09-13

## 2018-03-06 RX ORDER — CEFADROXIL 500 MG/1
CAPSULE ORAL
Refills: 0 | COMMUNITY
Start: 2018-01-16 | End: 2018-08-25

## 2018-03-06 RX ORDER — OSELTAMIVIR PHOSPHATE 75 MG/1
CAPSULE ORAL
Refills: 0 | COMMUNITY
Start: 2017-12-14 | End: 2018-08-25

## 2018-03-06 RX ORDER — LISINOPRIL 10 MG/1
TABLET ORAL
Refills: 2 | COMMUNITY
Start: 2018-02-07 | End: 2020-03-09

## 2018-03-06 RX ORDER — FAMOTIDINE 20 MG/1
TABLET, FILM COATED ORAL
Refills: 1 | COMMUNITY
Start: 2018-01-17 | End: 2018-09-14

## 2018-03-06 NOTE — PROGRESS NOTES
Subjective:      Patient ID: Yanique Thomson is a 57 y.o. female.    Chief Complaint: Follow-up (abnormal pap)      HPI  Patient is a 56yp female who originally presented as a referral from Dr. Addis Galeana for moderate vaginal dysplasia. 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      Follow up pap 1/16/17 negative for dysplasia. Repeat pap 7/2017 negative.       Presents today for follow up visit. An outside provider performed a pap smear in 1/2018 showing LSIL, negative HPV.      Review of Systems   Constitutional: Negative for appetite change, chills, fatigue and fever.   HENT: Negative for mouth sores.    Respiratory: Negative for cough and shortness of breath.    Cardiovascular: Negative for leg swelling.   Gastrointestinal: Negative for abdominal pain, blood in stool, constipation and diarrhea.   Endocrine: Negative for cold intolerance.   Genitourinary: Negative for dysuria and vaginal bleeding.   Musculoskeletal: Negative for myalgias.   Skin: Negative for rash.   Allergic/Immunologic: Negative.    Neurological: Negative for weakness and numbness.   Hematological: Negative for adenopathy. Does not bruise/bleed easily.   Psychiatric/Behavioral: Negative for confusion.       Objective:   Physical Exam:   Constitutional: She is oriented to person, place, and time. She appears well-developed and well-nourished.    HENT:   Head: Normocephalic and atraumatic.    Eyes: EOM are normal. Pupils are equal, round, and reactive to light.    Neck: Normal range of motion. Neck supple. No thyromegaly present.    Cardiovascular: Normal rate, regular rhythm and intact distal pulses.     Pulmonary/Chest: Effort normal and breath sounds normal. No respiratory distress. She has no wheezes.        Abdominal: Soft. Bowel sounds are normal. She exhibits no distension, no ascites and no mass. There  is no tenderness.     Genitourinary: Rectum normal and vagina normal. Pelvic exam was performed with patient supine. There is no lesion on the right labia. There is no lesion on the left labia. Uterus is absent. Right adnexum displays no mass. Left adnexum displays no mass. Vaginal cuff normal.Cervix exhibits absence.           Musculoskeletal: Normal range of motion and moves all extremeties.      Lymphadenopathy:     She has no cervical adenopathy.        Right: No inguinal and no supraclavicular adenopathy present.        Left: No inguinal and no supraclavicular adenopathy present.    Neurological: She is alert and oriented to person, place, and time.    Skin: Skin is warm and dry. No rash noted.    Psychiatric: She has a normal mood and affect.       Assessment:     1. Vaginal dysplasia    2. Vaginal atrophy        Plan:   No orders of the defined types were placed in this encounter.    I do not appreciate any evidence of disease on today's exam after thorough examination of the vagina and vaginal cuff. Recommend vaginal estrogen cream twice weekly and follow up with me annually.

## 2018-05-04 DIAGNOSIS — N95.2 VAGINAL ATROPHY: ICD-10-CM

## 2018-05-04 DIAGNOSIS — N89.3 VAGINAL DYSPLASIA: ICD-10-CM

## 2018-05-04 RX ORDER — ESTRADIOL 0.1 MG/G
CREAM VAGINAL
Qty: 42.5 G | Refills: 1 | Status: SHIPPED | OUTPATIENT
Start: 2018-05-04 | End: 2019-03-06 | Stop reason: SDUPTHER

## 2018-08-25 ENCOUNTER — HOSPITAL ENCOUNTER (EMERGENCY)
Facility: HOSPITAL | Age: 58
Discharge: HOME | End: 2018-08-25
Attending: EMERGENCY MEDICINE
Payer: MEDICAID

## 2018-08-25 VITALS
SYSTOLIC BLOOD PRESSURE: 167 MMHG | HEIGHT: 66 IN | TEMPERATURE: 98 F | RESPIRATION RATE: 20 BRPM | BODY MASS INDEX: 31.66 KG/M2 | WEIGHT: 197 LBS | DIASTOLIC BLOOD PRESSURE: 77 MMHG | HEART RATE: 68 BPM | OXYGEN SATURATION: 95 %

## 2018-08-25 DIAGNOSIS — R10.9 ABDOMINAL PAIN, UNSPECIFIED ABDOMINAL LOCATION: ICD-10-CM

## 2018-08-25 DIAGNOSIS — K52.9 COLITIS: Primary | ICD-10-CM

## 2018-08-25 DIAGNOSIS — R11.2 NAUSEA, VOMITING, AND DIARRHEA: ICD-10-CM

## 2018-08-25 DIAGNOSIS — R19.7 NAUSEA, VOMITING, AND DIARRHEA: ICD-10-CM

## 2018-08-25 LAB
ALBUMIN SERPL BCP-MCNC: 4.3 G/DL
ALP SERPL-CCNC: 63 U/L
ALT SERPL W/O P-5'-P-CCNC: 13 U/L
ANION GAP SERPL CALC-SCNC: 8 MMOL/L
AST SERPL-CCNC: 22 U/L
BASOPHILS # BLD AUTO: 0.02 K/UL
BASOPHILS NFR BLD: 0.3 %
BILIRUB SERPL-MCNC: 0.5 MG/DL
BILIRUB UR QL STRIP: NEGATIVE
BUN SERPL-MCNC: 15 MG/DL
CALCIUM SERPL-MCNC: 10 MG/DL
CHLORIDE SERPL-SCNC: 106 MMOL/L
CLARITY UR: ABNORMAL
CO2 SERPL-SCNC: 27 MMOL/L
COLOR UR: YELLOW
CREAT SERPL-MCNC: 1.1 MG/DL
DIFFERENTIAL METHOD: ABNORMAL
EOSINOPHIL # BLD AUTO: 0.1 K/UL
EOSINOPHIL NFR BLD: 1.1 %
ERYTHROCYTE [DISTWIDTH] IN BLOOD BY AUTOMATED COUNT: 13.5 %
EST. GFR  (AFRICAN AMERICAN): >60 ML/MIN/1.73 M^2
EST. GFR  (NON AFRICAN AMERICAN): 55 ML/MIN/1.73 M^2
GLUCOSE SERPL-MCNC: 110 MG/DL
GLUCOSE UR QL STRIP: NEGATIVE
HCT VFR BLD AUTO: 41.1 %
HGB BLD-MCNC: 13.3 G/DL
HGB UR QL STRIP: NEGATIVE
KETONES UR QL STRIP: NEGATIVE
LEUKOCYTE ESTERASE UR QL STRIP: NEGATIVE
LIPASE SERPL-CCNC: 28 U/L
LYMPHOCYTES # BLD AUTO: 2.7 K/UL
LYMPHOCYTES NFR BLD: 36.1 %
MAGNESIUM SERPL-MCNC: 3.1 MG/DL
MCH RBC QN AUTO: 31 PG
MCHC RBC AUTO-ENTMCNC: 32.4 G/DL
MCV RBC AUTO: 96 FL
MONOCYTES # BLD AUTO: 0.7 K/UL
MONOCYTES NFR BLD: 9.6 %
NEUTROPHILS # BLD AUTO: 3.9 K/UL
NEUTROPHILS NFR BLD: 52.8 %
NITRITE UR QL STRIP: NEGATIVE
PH UR STRIP: 7 [PH] (ref 5–8)
PLATELET # BLD AUTO: 264 K/UL
PMV BLD AUTO: 9 FL
POTASSIUM SERPL-SCNC: 4 MMOL/L
PROT SERPL-MCNC: 8.6 G/DL
PROT UR QL STRIP: NEGATIVE
RBC # BLD AUTO: 4.29 M/UL
SODIUM SERPL-SCNC: 141 MMOL/L
SP GR UR STRIP: 1.01 (ref 1–1.03)
URN SPEC COLLECT METH UR: ABNORMAL
UROBILINOGEN UR STRIP-ACNC: NEGATIVE EU/DL
WBC # BLD AUTO: 7.47 K/UL

## 2018-08-25 PROCEDURE — 25500020 PHARM REV CODE 255: Performed by: NURSE PRACTITIONER

## 2018-08-25 PROCEDURE — 81003 URINALYSIS AUTO W/O SCOPE: CPT

## 2018-08-25 PROCEDURE — 83735 ASSAY OF MAGNESIUM: CPT

## 2018-08-25 PROCEDURE — 99284 EMERGENCY DEPT VISIT MOD MDM: CPT | Mod: 25

## 2018-08-25 PROCEDURE — 96361 HYDRATE IV INFUSION ADD-ON: CPT

## 2018-08-25 PROCEDURE — 63600175 PHARM REV CODE 636 W HCPCS: Performed by: NURSE PRACTITIONER

## 2018-08-25 PROCEDURE — 80053 COMPREHEN METABOLIC PANEL: CPT

## 2018-08-25 PROCEDURE — 83690 ASSAY OF LIPASE: CPT

## 2018-08-25 PROCEDURE — 96374 THER/PROPH/DIAG INJ IV PUSH: CPT | Mod: 59

## 2018-08-25 PROCEDURE — 25000003 PHARM REV CODE 250: Performed by: NURSE PRACTITIONER

## 2018-08-25 PROCEDURE — 85025 COMPLETE CBC W/AUTO DIFF WBC: CPT

## 2018-08-25 RX ORDER — METRONIDAZOLE 500 MG/1
500 TABLET ORAL 3 TIMES DAILY
Qty: 21 TABLET | Refills: 0 | Status: SHIPPED | OUTPATIENT
Start: 2018-08-25 | End: 2018-09-01

## 2018-08-25 RX ORDER — ONDANSETRON 2 MG/ML
8 INJECTION INTRAMUSCULAR; INTRAVENOUS
Status: COMPLETED | OUTPATIENT
Start: 2018-08-25 | End: 2018-08-25

## 2018-08-25 RX ORDER — CIPROFLOXACIN 500 MG/1
500 TABLET ORAL 2 TIMES DAILY
Qty: 14 TABLET | Refills: 0 | Status: SHIPPED | OUTPATIENT
Start: 2018-08-25 | End: 2018-09-01

## 2018-08-25 RX ORDER — ONDANSETRON 4 MG/1
4 TABLET, ORALLY DISINTEGRATING ORAL EVERY 6 HOURS PRN
Qty: 20 TABLET | Refills: 0 | Status: SHIPPED | OUTPATIENT
Start: 2018-08-25 | End: 2019-03-06 | Stop reason: SDUPTHER

## 2018-08-25 RX ADMIN — IOHEXOL 100 ML: 350 INJECTION, SOLUTION INTRAVENOUS at 11:08

## 2018-08-25 RX ADMIN — SODIUM CHLORIDE 1000 ML: 0.9 INJECTION, SOLUTION INTRAVENOUS at 11:08

## 2018-08-25 RX ADMIN — ONDANSETRON 8 MG: 2 SOLUTION INTRAMUSCULAR; INTRAVENOUS at 11:08

## 2018-08-25 NOTE — DISCHARGE INSTRUCTIONS
Take antibiotics as prescribed.    Treat nausea with nausea medication as needed only as prescribed.    Follow-up with your primary physician for further evaluation management.    Return to the emergency department for any new or worsening symptoms or as needed.

## 2018-08-25 NOTE — ED PROVIDER NOTES
"Encounter Date: 8/25/2018    This is a SORT/MSE of a 58 y.o. female presenting to the ED with c/o abdominal pain/pressure sensation with multiple episodes of diarrhea and 2 episodes of vomiting yesterday. Care will be transferred to an alternate provider when patient is roomed for a full evaluation and final disposition. JOANA Pardo, HANKP-C 8/25/2018 9:53 AM       History     Chief Complaint   Patient presents with    Abdominal Pain     pt reports lower abdominal pain that began yesterday, pt stated "I feel a lot of pressure in my stomach." Pt reported that she has had several bowel movments and now they are loose stools also reports x2 episodes of emesis yesterday     58-year-old female with a history of hypertension, seizure disorder, GERD, hyperlipidemia, presenting for evaluation of diffuse abdominal pain, nausea, vomiting, and diarrhea.  History of cholecystectomy and appendectomy.  Her symptoms began 2 days ago and having constant.  She has difficulty localizing the pain to any specific part of her abdomen.  She believes the pain began after eating spicy fried chicken.  She denies hematemesis, hematochezia, melena, constipation, fever, urea, urinary frequency, vaginal bleeding, vaginal discharge, back pain, or any additional symptoms. She has not attempted any medications for her symptoms.          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    Seizures      Past Surgical History:   Procedure Laterality Date    APPENDECTOMY      CHOLECYSTECTOMY      HYSTERECTOMY      laser to vaginal      LASER-VAGINA N/A 7/8/2016    [facility]    PARTIAL HYMENECTOMY      sweat glands Bilateral     removial      Family History   Problem Relation Age of Onset    " Hypertension Father     Hypertension Mother     Diabetes Sister     Cancer Sister      Social History     Tobacco Use    Smoking status: Never Smoker    Smokeless tobacco: Never Used   Substance Use Topics    Alcohol use: Yes     Alcohol/week: 0.6 oz     Types: 1 Glasses of wine per week     Comment: occasional    Drug use: No     Review of Systems   Constitutional: Negative for chills, fatigue and fever.   HENT: Negative for congestion, ear pain, nosebleeds, postnasal drip, rhinorrhea, sinus pressure and sore throat.    Eyes: Negative for photophobia and pain.   Respiratory: Negative for apnea, cough, choking, chest tightness, shortness of breath, wheezing and stridor.    Cardiovascular: Negative for chest pain, palpitations and leg swelling.   Gastrointestinal: Positive for abdominal pain, diarrhea, nausea and vomiting. Negative for abdominal distention, anal bleeding, blood in stool, constipation and rectal pain.   Genitourinary: Negative for dysuria, flank pain, hematuria, pelvic pain and vaginal discharge.   Musculoskeletal: Negative for arthralgias, back pain, gait problem and neck pain.   Skin: Negative for color change, pallor, rash and wound.   Neurological: Negative for dizziness, seizures, syncope, facial asymmetry, light-headedness and headaches.   Hematological: Negative for adenopathy.   Psychiatric/Behavioral: Negative for confusion. The patient is not nervous/anxious.        Physical Exam     Initial Vitals [08/25/18 0954]   BP Pulse Resp Temp SpO2   136/78 89 19 98.3 °F (36.8 °C) 99 %      MAP       --         Physical Exam    Nursing note and vitals reviewed.  Constitutional: Vital signs are normal. She appears well-developed and well-nourished. She is not diaphoretic. She is Obese . She is active and cooperative.  Non-toxic appearance. She does not have a sickly appearance. She does not appear ill. No distress.   HENT:   Head: Normocephalic and atraumatic.   Right Ear: External ear normal.    Left Ear: External ear normal.   Nose: Nose normal.   Eyes: Conjunctivae and EOM are normal. Pupils are equal, round, and reactive to light. Right eye exhibits no discharge. Left eye exhibits no discharge. No scleral icterus.   Neck: Normal range of motion. Neck supple. No thyromegaly present. No tracheal deviation present. No JVD present.   Cardiovascular: Normal rate, regular rhythm, normal heart sounds and intact distal pulses. Exam reveals no gallop and no friction rub.    No murmur heard.  Pulmonary/Chest: Breath sounds normal. No stridor. No respiratory distress. She has no wheezes. She has no rhonchi. She has no rales.   Abdominal: Soft. Bowel sounds are normal. She exhibits no distension and no mass. There is tenderness in the right lower quadrant, periumbilical area, left upper quadrant and left lower quadrant. There is guarding. There is no rigidity, no rebound, no tenderness at McBurney's point and negative Peoples's sign.   Right lower quadrant, left lower quadrant, periumbilical, and left upper quadrant tenderness to palpation with voluntary guarding. No rigidity.  Patient is obese.  Exam limited by body habitus.   Musculoskeletal: Normal range of motion. She exhibits no edema or tenderness.   Lymphadenopathy:     She has no cervical adenopathy.   Neurological: She is alert and oriented to person, place, and time. She has normal strength and normal reflexes. She displays normal reflexes. No cranial nerve deficit or sensory deficit.   Skin: Skin is warm and dry. No rash and no abscess noted. No erythema. No pallor.   Psychiatric: She has a normal mood and affect. Her behavior is normal. Judgment and thought content normal.         ED Course   Procedures  Labs Reviewed   CBC W/ AUTO DIFFERENTIAL - Abnormal; Notable for the following components:       Result Value    MPV 9.0 (*)     All other components within normal limits   COMPREHENSIVE METABOLIC PANEL - Abnormal; Notable for the following components:     Total Protein 8.6 (*)     eGFR if non  55 (*)     All other components within normal limits   URINALYSIS, REFLEX TO URINE CULTURE - Abnormal; Notable for the following components:    Appearance, UA Hazy (*)     All other components within normal limits    Narrative:     Preferred Collection Type->Urine, Clean Catch   MAGNESIUM - Abnormal; Notable for the following components:    Magnesium 3.1 (*)     All other components within normal limits   LIPASE          Imaging Results          CT Abdomen Pelvis With Contrast (Final result)  Result time 08/25/18 11:39:12    Final result by Javed Agarwal MD (08/25/18 11:39:12)                 Impression:      Findings as described above can be seen with colitis.  Other stable findings as above      Electronically signed by: Javed Agarwal MD  Date:    08/25/2018  Time:    11:39             Narrative:    EXAMINATION:  CT ABDOMEN PELVIS WITH CONTRAST    CLINICAL HISTORY:  Abdominal pain, unspecified;    TECHNIQUE:  Low dose axial images, sagittal and coronal reformations were obtained from the lung bases to the pubic symphysis following the IV administration of 100 mL of Omnipaque 350 and the oral administration of 30 mL of Omnipaque 350.    COMPARISON:  The CT from 12/03/2016    FINDINGS:  Detail limited by habitus.  Limited imaging through the inferior thorax demonstrates the lung bases to be clear.  Review of bone windows demonstrates the osseous structures be intact.  Mild grade 1 anterolisthesis of L4 on L5 is again visualized.  There are degenerative changes of the thoracolumbar spine.  No aggressive osseous lesions are seen.    Mild hepatic steatosis is seen.  The gallbladder is absent.  There is interval resolution of previously seen intrahepatic and extrahepatic biliary dilation.  The spleen as well as the pancreas demonstrate a normal appearance.  Adrenal glands are unremarkable.  There is no evidence hydronephrosis.  Kidneys excrete contrast  symmetrically.  Urinary bladder is contracted.    Aorta and IVC appear to be within normal limits.  There is a small fat containing umbilical hernia.  Small scattered shotty bilateral inguinal lymph nodes are seen.  There is no bulky adenopathy.  No ascites.  Uterus is absent.  The stomach as well as the small bowel are normal without evidence of bowel obstruction.  No right lower quadrant inflammatory changes to suggest appendicitis.  Fluid filled loops of colon are seen as can be seen with diarrhea.  There is a target appearance of the descending colon which can be seen in the setting of colitis.  No evidence of diverticulitis                                 Medical Decision Making:   Differential Diagnosis:   Bowel obstruction, pancreatitis, diverticulitis, colitis, mesenteric ischemia, others  Clinical Tests:   Lab Tests: Ordered and Reviewed  Radiological Study: Ordered and Reviewed  ED Management:  58-year-old female presenting for evaluation of diffuse abdominal pain with associated vomiting and diarrhea.  Symptoms began 2 days ago.  History of several abdominal surgeries including cholecystectomy and appendectomy.  Patient is afebrile, well-appearing, and in no distress.  There is lower abdominal tenderness to palpation with some voluntary guarding. No rigidity.  Labs are unremarkable. CT shows evidence of colitis.  No other acute abnormalities visualized.  Treated with normal saline bolus and Zofran after which symptoms are improved.  Prescribed Cipro and Flagyl at discharge as well as Zofran for nausea control.  Other:   I have discussed this case with another health care provider.       <> Summary of the Discussion: Case discussed with my attending physician who agreed with the assessment and plan                      Clinical Impression:   The primary encounter diagnosis was Colitis. Diagnoses of Nausea, vomiting, and diarrhea and Abdominal pain, unspecified abdominal location were also pertinent to this  visit.      Disposition:   Disposition: Discharged  Condition: Stable                        Lazaro Mann NP  09/12/18 0844

## 2018-08-25 NOTE — ED TRIAGE NOTES
Pt. Reports abd pain since yesterday. Pt. Reports her symptoms include nausea, loose stool and emesis on yesterday. Pt. Reports pain in her lower abd and pressure like sensation. Pt. States that he took Mylanta, naproxen with no alleviation. Pain is 10/10

## 2018-09-13 ENCOUNTER — HOSPITAL ENCOUNTER (EMERGENCY)
Facility: HOSPITAL | Age: 58
Discharge: HOME | End: 2018-09-14
Attending: EMERGENCY MEDICINE
Payer: MEDICAID

## 2018-09-13 DIAGNOSIS — R07.9 CHEST PAIN: ICD-10-CM

## 2018-09-13 DIAGNOSIS — R19.7 DIARRHEA, UNSPECIFIED TYPE: ICD-10-CM

## 2018-09-13 DIAGNOSIS — R10.9 ABDOMINAL PAIN, UNSPECIFIED ABDOMINAL LOCATION: Primary | ICD-10-CM

## 2018-09-13 DIAGNOSIS — R07.89 CHEST WALL PAIN: ICD-10-CM

## 2018-09-13 LAB
ALBUMIN SERPL BCP-MCNC: 4.3 G/DL
ALP SERPL-CCNC: 95 U/L
ALT SERPL W/O P-5'-P-CCNC: 81 U/L
AMORPH CRY URNS QL MICRO: NORMAL
ANION GAP SERPL CALC-SCNC: 9 MMOL/L
AST SERPL-CCNC: 175 U/L
BASOPHILS # BLD AUTO: 0.03 K/UL
BASOPHILS NFR BLD: 0.3 %
BILIRUB SERPL-MCNC: 0.6 MG/DL
BILIRUB UR QL STRIP: NEGATIVE
BUN SERPL-MCNC: 17 MG/DL
CALCIUM SERPL-MCNC: 10.1 MG/DL
CHLORIDE SERPL-SCNC: 103 MMOL/L
CLARITY UR: ABNORMAL
CO2 SERPL-SCNC: 28 MMOL/L
COLOR UR: YELLOW
CREAT SERPL-MCNC: 1.1 MG/DL
CRP SERPL-MCNC: 4 MG/L
DIFFERENTIAL METHOD: ABNORMAL
EOSINOPHIL # BLD AUTO: 0.1 K/UL
EOSINOPHIL NFR BLD: 1.2 %
ERYTHROCYTE [DISTWIDTH] IN BLOOD BY AUTOMATED COUNT: 13 %
ERYTHROCYTE [SEDIMENTATION RATE] IN BLOOD BY WESTERGREN METHOD: 19 MM/HR
EST. GFR  (AFRICAN AMERICAN): >60 ML/MIN/1.73 M^2
EST. GFR  (NON AFRICAN AMERICAN): 55 ML/MIN/1.73 M^2
GLUCOSE SERPL-MCNC: 102 MG/DL
GLUCOSE UR QL STRIP: NEGATIVE
HCT VFR BLD AUTO: 37.2 %
HGB BLD-MCNC: 12.7 G/DL
HGB UR QL STRIP: NEGATIVE
KETONES UR QL STRIP: ABNORMAL
LEUKOCYTE ESTERASE UR QL STRIP: NEGATIVE
LIPASE SERPL-CCNC: 21 U/L
LYMPHOCYTES # BLD AUTO: 3.2 K/UL
LYMPHOCYTES NFR BLD: 35.1 %
MCH RBC QN AUTO: 31.6 PG
MCHC RBC AUTO-ENTMCNC: 34.1 G/DL
MCV RBC AUTO: 93 FL
MICROSCOPIC COMMENT: NORMAL
MONOCYTES # BLD AUTO: 0.9 K/UL
MONOCYTES NFR BLD: 10.2 %
NEUTROPHILS # BLD AUTO: 4.8 K/UL
NEUTROPHILS NFR BLD: 53.2 %
NITRITE UR QL STRIP: NEGATIVE
PH UR STRIP: 8 [PH] (ref 5–8)
PLATELET # BLD AUTO: 232 K/UL
PMV BLD AUTO: 9.2 FL
POTASSIUM SERPL-SCNC: 3.5 MMOL/L
PROT SERPL-MCNC: 8.3 G/DL
PROT UR QL STRIP: NEGATIVE
RBC # BLD AUTO: 4.02 M/UL
SODIUM SERPL-SCNC: 140 MMOL/L
SP GR UR STRIP: 1.01 (ref 1–1.03)
URN SPEC COLLECT METH UR: ABNORMAL
UROBILINOGEN UR STRIP-ACNC: NEGATIVE EU/DL
WBC # BLD AUTO: 9.01 K/UL
WBC #/AREA URNS HPF: 1 /HPF (ref 0–5)

## 2018-09-13 PROCEDURE — 93005 ELECTROCARDIOGRAM TRACING: CPT

## 2018-09-13 PROCEDURE — 96372 THER/PROPH/DIAG INJ SC/IM: CPT | Mod: 59

## 2018-09-13 PROCEDURE — 96374 THER/PROPH/DIAG INJ IV PUSH: CPT

## 2018-09-13 PROCEDURE — 63600175 PHARM REV CODE 636 W HCPCS: Performed by: EMERGENCY MEDICINE

## 2018-09-13 PROCEDURE — 25000003 PHARM REV CODE 250: Performed by: PHYSICIAN ASSISTANT

## 2018-09-13 PROCEDURE — 81000 URINALYSIS NONAUTO W/SCOPE: CPT

## 2018-09-13 PROCEDURE — 25000003 PHARM REV CODE 250: Performed by: EMERGENCY MEDICINE

## 2018-09-13 PROCEDURE — 83690 ASSAY OF LIPASE: CPT

## 2018-09-13 PROCEDURE — 99285 EMERGENCY DEPT VISIT HI MDM: CPT | Mod: 25

## 2018-09-13 PROCEDURE — 96375 TX/PRO/DX INJ NEW DRUG ADDON: CPT

## 2018-09-13 PROCEDURE — 93010 ELECTROCARDIOGRAM REPORT: CPT | Mod: ,,, | Performed by: INTERNAL MEDICINE

## 2018-09-13 PROCEDURE — 80053 COMPREHEN METABOLIC PANEL: CPT

## 2018-09-13 PROCEDURE — 85025 COMPLETE CBC W/AUTO DIFF WBC: CPT

## 2018-09-13 PROCEDURE — 96376 TX/PRO/DX INJ SAME DRUG ADON: CPT

## 2018-09-13 PROCEDURE — 85652 RBC SED RATE AUTOMATED: CPT

## 2018-09-13 PROCEDURE — 96361 HYDRATE IV INFUSION ADD-ON: CPT

## 2018-09-13 PROCEDURE — 86140 C-REACTIVE PROTEIN: CPT

## 2018-09-13 RX ORDER — MORPHINE SULFATE 4 MG/ML
2 INJECTION, SOLUTION INTRAMUSCULAR; INTRAVENOUS
Status: DISCONTINUED | OUTPATIENT
Start: 2018-09-14 | End: 2018-09-14

## 2018-09-13 RX ORDER — PANTOPRAZOLE SODIUM 40 MG/10ML
40 INJECTION, POWDER, LYOPHILIZED, FOR SOLUTION INTRAVENOUS
Status: COMPLETED | OUTPATIENT
Start: 2018-09-14 | End: 2018-09-14

## 2018-09-13 RX ORDER — MORPHINE SULFATE 4 MG/ML
2 INJECTION, SOLUTION INTRAMUSCULAR; INTRAVENOUS
Status: COMPLETED | OUTPATIENT
Start: 2018-09-13 | End: 2018-09-13

## 2018-09-13 RX ORDER — ONDANSETRON 2 MG/ML
4 INJECTION INTRAMUSCULAR; INTRAVENOUS
Status: COMPLETED | OUTPATIENT
Start: 2018-09-13 | End: 2018-09-13

## 2018-09-13 RX ORDER — DICYCLOMINE HYDROCHLORIDE 10 MG/ML
20 INJECTION INTRAMUSCULAR
Status: COMPLETED | OUTPATIENT
Start: 2018-09-14 | End: 2018-09-14

## 2018-09-13 RX ADMIN — MORPHINE SULFATE 2 MG: 4 INJECTION INTRAVENOUS at 10:09

## 2018-09-13 RX ADMIN — SODIUM CHLORIDE 1000 ML: 0.9 INJECTION, SOLUTION INTRAVENOUS at 08:09

## 2018-09-13 RX ADMIN — LIDOCAINE HYDROCHLORIDE: 20 SOLUTION ORAL; TOPICAL at 10:09

## 2018-09-13 RX ADMIN — ONDANSETRON 4 MG: 2 INJECTION INTRAMUSCULAR; INTRAVENOUS at 10:09

## 2018-09-14 ENCOUNTER — TELEPHONE (OUTPATIENT)
Dept: GASTROENTEROLOGY | Facility: CLINIC | Age: 58
End: 2018-09-14

## 2018-09-14 VITALS
TEMPERATURE: 98 F | WEIGHT: 202 LBS | DIASTOLIC BLOOD PRESSURE: 72 MMHG | SYSTOLIC BLOOD PRESSURE: 145 MMHG | HEART RATE: 70 BPM | RESPIRATION RATE: 18 BRPM | OXYGEN SATURATION: 96 % | HEIGHT: 66 IN | BODY MASS INDEX: 32.47 KG/M2

## 2018-09-14 LAB
BNP SERPL-MCNC: 23 PG/ML
TROPONIN I SERPL DL<=0.01 NG/ML-MCNC: <0.006 NG/ML

## 2018-09-14 PROCEDURE — 84484 ASSAY OF TROPONIN QUANT: CPT

## 2018-09-14 PROCEDURE — 25000003 PHARM REV CODE 250: Performed by: EMERGENCY MEDICINE

## 2018-09-14 PROCEDURE — 83880 ASSAY OF NATRIURETIC PEPTIDE: CPT

## 2018-09-14 PROCEDURE — C9113 INJ PANTOPRAZOLE SODIUM, VIA: HCPCS | Performed by: EMERGENCY MEDICINE

## 2018-09-14 PROCEDURE — 63600175 PHARM REV CODE 636 W HCPCS: Performed by: EMERGENCY MEDICINE

## 2018-09-14 RX ORDER — PANTOPRAZOLE SODIUM 20 MG/1
20 TABLET, DELAYED RELEASE ORAL DAILY
Qty: 20 TABLET | Refills: 0 | Status: SHIPPED | OUTPATIENT
Start: 2018-09-14 | End: 2018-09-20 | Stop reason: SDUPTHER

## 2018-09-14 RX ORDER — ALPRAZOLAM 0.25 MG/1
0.25 TABLET ORAL
Status: COMPLETED | OUTPATIENT
Start: 2018-09-14 | End: 2018-09-14

## 2018-09-14 RX ORDER — HYDROMORPHONE HYDROCHLORIDE 2 MG/ML
1 INJECTION, SOLUTION INTRAMUSCULAR; INTRAVENOUS; SUBCUTANEOUS
Status: COMPLETED | OUTPATIENT
Start: 2018-09-14 | End: 2018-09-14

## 2018-09-14 RX ORDER — DICYCLOMINE HYDROCHLORIDE 20 MG/1
20 TABLET ORAL 2 TIMES DAILY
Qty: 20 TABLET | Refills: 0 | Status: SHIPPED | OUTPATIENT
Start: 2018-09-14 | End: 2018-09-20 | Stop reason: SDUPTHER

## 2018-09-14 RX ORDER — PROMETHAZINE HYDROCHLORIDE 25 MG/1
25 TABLET ORAL EVERY 6 HOURS PRN
Qty: 15 TABLET | Refills: 0 | Status: SHIPPED | OUTPATIENT
Start: 2018-09-14 | End: 2020-03-09

## 2018-09-14 RX ADMIN — HYDROMORPHONE HYDROCHLORIDE 1 MG: 2 INJECTION INTRAMUSCULAR; INTRAVENOUS; SUBCUTANEOUS at 01:09

## 2018-09-14 RX ADMIN — HYDROMORPHONE HYDROCHLORIDE 1 MG: 2 INJECTION INTRAMUSCULAR; INTRAVENOUS; SUBCUTANEOUS at 12:09

## 2018-09-14 RX ADMIN — DICYCLOMINE HYDROCHLORIDE 20 MG: 10 INJECTION INTRAMUSCULAR at 12:09

## 2018-09-14 RX ADMIN — PANTOPRAZOLE SODIUM 40 MG: 40 INJECTION, POWDER, FOR SOLUTION INTRAVENOUS at 12:09

## 2018-09-14 RX ADMIN — ALPRAZOLAM 0.25 MG: 0.25 TABLET ORAL at 01:09

## 2018-09-14 NOTE — TELEPHONE ENCOUNTER
----- Message from Jessica Hyde MA sent at 9/14/2018  9:39 AM CDT -----  Contact: self  [phone]  Patient Requesting Sooner Appointment.     Reason for sooner appt.:  I was asked by the emergency department on the [facility] to follow up due to what they think maybe colitis.  I have been with stomach pain and diarrhea  When is the first available appointment?  Nurse to schedule  Communication Preference:  Phone# above  Additional Information:  na

## 2018-09-14 NOTE — TELEPHONE ENCOUNTER
Ma spoke with pt ,appt scheduled pt states she has been to the er several times and was told to follow up with GI , pt states shes in pain and would like an appt asap

## 2018-09-14 NOTE — ED TRIAGE NOTES
Patient presents to the ER with son via personal vehicle. Patient presents with abdominal pain, chest pain (pressure), and diarrhea. Reports chest pain started today. Hx colitis. 9/10 pain

## 2018-09-14 NOTE — ED PROVIDER NOTES
"Encounter Date: 9/13/2018    SCRIBE #1 NOTE: I, Leighann Lu, am scribing for, and in the presence of,  Artem Avila MD. I have scribed the following portions of the note - Other sections scribed: HPI, ROS, PE and Differential.       History     Chief Complaint   Patient presents with    Abdominal Pain     "They said I had colitis, nothing has healed, nothing has got any better. I went to my primary doctor and he gave me a steroid shot and a b12 shot and I feel worse than I was. Now my chest is hurting."    Chest Pain     CC: Abdominal Pain    HPI: This 58 y.o. Female, with a medical history of epilepsy), GERD, hyperchloremia, hypertension and seizures,  presents to the ED c/o severe (10/10) periumbilical and suprapubic abdominal pain for the past 5x weeks. Pt describes the symptoms as a "burning" and "aching" sensation accompanied by a feeling of "pressure", noting that the pain is constant, but waxing and waning in intensity. Pt reports that the symptoms are accompanied by intermittent watery diarrhea, sporadic episodes of emesis (4 episodes total since onset of symptoms), belching and an intermittent fever (highest temperature: 100.1 F). She states that she was initially evaluated at this ED at the onset of symptoms where she had a CT scan preformed and was placed on Flagyl and Cipro for treatment without any improvement. Pt notes that she then visited her PCP on 9/6/18, and was given a steroid shot as well as a B12 injection without any relief as the symptoms are presently persisting. She reports that she also began to experience intermittent non-exertional chest pain (described as a pressure; exacerbated with onset of worsened abdominal pain) today which prompted her to come into the ED. She also notes experiencing lower back pain and palpitations. Pt reports undergoing her last colonoscopy 2x years ago, noting that 1x polyp was found. Pt denies recent foreign travel or consumption of new foods. She " also denies cough, dysuria, hematuria and black or bloody stools. No family history of Crohn's disease or ulcerative colitis. No other associated symptoms.              The history is provided by the patient. No  was used.     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    PARTIAL HYMENECTOMY      sweat glands Bilateral     removial      Family History   Problem Relation Age of Onset    Hypertension Father     Hypertension Mother     Diabetes Sister     Cancer Sister      Social History     Tobacco Use    Smoking status: Never Smoker    Smokeless tobacco: Never Used   Substance Use Topics    Alcohol use: Yes     Alcohol/week: 0.6 oz     Types: 1 Glasses of wine per week     Comment: occasional    Drug use: No     Review of Systems   Constitutional: Positive for fever. Negative for chills.   HENT: Negative for congestion, ear pain, rhinorrhea and sore throat.    Eyes: Negative for pain and visual disturbance.   Respiratory: Negative for cough.    Cardiovascular: Positive for chest pain and palpitations.   Gastrointestinal: Positive for abdominal pain (periumbilical and suprapubic), diarrhea and vomiting. Negative for blood in stool and nausea.        (+) belching   Genitourinary: Negative for dysuria and hematuria.   Musculoskeletal: Positive for back pain (lower). Negative for neck pain.   Skin: Negative for rash.   Neurological: Negative for headaches.       Physical Exam     Initial Vitals [09/13/18 1929]   BP Pulse Resp Temp SpO2   (!) 175/93  97 18 97.7 °F (36.5 °C) 96 %      MAP       --         Physical Exam    Nursing note and vitals reviewed.  Constitutional: She appears well-developed and well-nourished.  Non-toxic appearance. She does not appear ill. No distress.   HENT:   Head: Normocephalic and atraumatic.   Nose: Nose normal.   Mouth/Throat: Oropharynx is clear and moist.   Eyes: EOM are normal. Pupils are equal, round, and reactive to light. No scleral icterus.   Neck: Normal range of motion. Neck supple.   Cardiovascular: Normal rate, regular rhythm and normal heart sounds.   No murmur heard.  Pulmonary/Chest: Effort normal and breath sounds normal. No respiratory distress. She has no wheezes. She has no rhonchi. She has no rales.   There is tenderness to the sternum, which reproduces the chest pain.   Abdominal: Soft. Normal appearance and bowel sounds are normal. She exhibits no distension and no mass. There is no tenderness. There is no rebound and no guarding.   There is mild tenderness to the mid abdomen (to the epigastric, periumbilical and suprapubic regions).    Musculoskeletal: Normal range of motion. She exhibits no edema or tenderness.   Neurological: She is alert and oriented to person, place, and time. She has normal strength.   Skin: Skin is warm and dry.   Psychiatric: She has a normal mood and affect.         ED Course   Procedures  Labs Reviewed   CBC W/ AUTO DIFFERENTIAL - Abnormal; Notable for the following components:       Result Value    MCH 31.6 (*)     All other components within normal limits   COMPREHENSIVE METABOLIC PANEL - Abnormal; Notable for the following components:     (*)     ALT 81 (*)     eGFR if non  55 (*)     All other components within normal limits   URINALYSIS, REFLEX TO URINE CULTURE - Abnormal; Notable for the following components:    Appearance, UA Cloudy (*)     Ketones, UA Trace (*)     All other components within normal limits    Narrative:     Preferred Collection  Type->Urine, Clean Catch   CULTURE, STOOL   CLOSTRIDIUM DIFFICILE   LIPASE   SEDIMENTATION RATE   C-REACTIVE PROTEIN   URINALYSIS MICROSCOPIC    Narrative:     Preferred Collection Type->Urine, Clean Catch   TROPONIN I   B-TYPE NATRIURETIC PEPTIDE   STOOL EXAM-OVA,CYSTS,PARASITES     EKG Readings: (Independently Interpreted)   Initial Reading: No STEMI. Previous EKG: Compared with most recent EKG Previous EKG Date: 10/15/17. Rhythm: Normal Sinus Rhythm. Heart Rate: 89. Ectopy: No Ectopy. Conduction: Normal. ST Segments: Non-Specific ST Segment Depression. T Waves Flipped: V1 and V2. Q Waves: III and AVR.   Unchanged from previous.       Imaging Results          X-Ray Chest 1 View (Final result)  Result time 09/13/18 22:56:47    Final result by Sidney Love MD (09/13/18 22:56:47)                 Impression:      No acute cardiopulmonary process identified.      Electronically signed by: Sidney Love MD  Date:    09/13/2018  Time:    22:56             Narrative:    EXAMINATION:  XR CHEST 1 VIEW    CLINICAL HISTORY:  Chest pain, unspecified    TECHNIQUE:  Single frontal view of the chest was performed.    COMPARISON:  None    FINDINGS:  Heart is normal in size.  There is elevation of the right hemidiaphragm with asymmetric volume loss in the right lung compared to the left.  No evidence of focal consolidative process, pneumothorax, or significant effusion.  No acute osseous abnormality identified.                               CT Renal Stone Study ABD Pelvis WO (Final result)  Result time 09/13/18 22:01:38    Final result by Sidney Love MD (09/13/18 22:01:38)                 Impression:      1. No acute intra-abdominal abnormalities identified.  2. Postsurgical changes of cholecystectomy and hysterectomy.      Electronically signed by: Sidney Love MD  Date:    09/13/2018  Time:    22:01             Narrative:    EXAMINATION:  CT RENAL STONE STUDY ABD PELVIS WO    CLINICAL HISTORY:  Abdominal pain,  unspecified;    TECHNIQUE:  Low dose axial images, sagittal and coronal reformations were obtained from the lung bases to the pubic symphysis.  Contrast was not administered.    COMPARISON:  CT abdomen and pelvis from 08/25/2018.    FINDINGS:  The visualized portion of the heart is unremarkable.  The lung bases are clear.    No significant hepatic abnormalities are identified.  There is no intra-or extrahepatic biliary ductal dilatation.  Gallbladder has been removed.  The stomach, pancreas, spleen, and adrenal glands are unremarkable.    Kidneys show no evidence of stones or hydronephrosis.  Pelvic phleboliths are seen along the distal ureteral courses with no definite ureteral stones seen.  Urinary bladder is nondistended.  Uterus has been removed.    Appendix is visualized and is unremarkable.  The visualized loops of small and large bowel show no evidence of obstruction or inflammation.  No free air or free fluid.    Aorta tapers normally.    No acute osseous abnormality identified.  There is grade 1 anterolisthesis of L4 on L5 secondary to prominent facet arthropathy.  Subcutaneous soft tissue structures are unremarkable.                                 Medical Decision Making:   Differential Diagnosis:   Differential diagnosis includes inflammatory bowel disease, diverticulitis and IBS.  Pain improved. Chest pain reproducible on palpation. Abdominal pain has been present for 5 weeks. Patient unable to provide stool sample. Will refer to GI for further evaluation as outpatient. Will treat symptoms. No signs of acute surgical disease on life threatening medical condition.     Additional MDM:   Heart Score:    History:          Slightly suspicious.  ECG:             Normal  Age:               45-65 years  Risk factors: 1-2 risk factors  Troponin:       Less than or equal to normal limit  Final Score: 2             Scribe Attestation:   Scribe #1: I performed the above scribed service and the documentation  accurately describes the services I performed. I attest to the accuracy of the note.    Attending Attestation:           Physician Attestation for Scribe:  Physician Attestation Statement for Scribe #1: I, Artem Avila MD, reviewed documentation, as scribed by Leighann Leigh in my presence, and it is both accurate and complete.                    Clinical Impression:   The primary encounter diagnosis was Abdominal pain, unspecified abdominal location. Diagnoses of Chest pain, Chest wall pain, and Diarrhea, unspecified type were also pertinent to this visit.      Disposition:   Disposition: Discharged  Condition: Stable                        Artem Avila MD  09/14/18 0228

## 2018-09-20 ENCOUNTER — OFFICE VISIT (OUTPATIENT)
Dept: GASTROENTEROLOGY | Facility: CLINIC | Age: 58
End: 2018-09-20
Payer: MEDICAID

## 2018-09-20 VITALS
HEART RATE: 72 BPM | BODY MASS INDEX: 31.18 KG/M2 | HEIGHT: 66 IN | SYSTOLIC BLOOD PRESSURE: 145 MMHG | DIASTOLIC BLOOD PRESSURE: 85 MMHG | WEIGHT: 194 LBS

## 2018-09-20 DIAGNOSIS — R10.13 EPIGASTRIC PAIN: ICD-10-CM

## 2018-09-20 DIAGNOSIS — R10.30 LOWER ABDOMINAL PAIN: ICD-10-CM

## 2018-09-20 DIAGNOSIS — R93.3 ABNORMAL CT SCAN, COLON: ICD-10-CM

## 2018-09-20 DIAGNOSIS — R10.33 PERIUMBILICAL PAIN: Primary | ICD-10-CM

## 2018-09-20 DIAGNOSIS — R19.7 DIARRHEA OF PRESUMED INFECTIOUS ORIGIN: ICD-10-CM

## 2018-09-20 DIAGNOSIS — R94.31 ABNORMAL ECG: ICD-10-CM

## 2018-09-20 DIAGNOSIS — R07.9 CHEST PAIN, UNSPECIFIED TYPE: ICD-10-CM

## 2018-09-20 DIAGNOSIS — K21.9 GASTROESOPHAGEAL REFLUX DISEASE WITHOUT ESOPHAGITIS: ICD-10-CM

## 2018-09-20 PROCEDURE — 99204 OFFICE O/P NEW MOD 45 MIN: CPT | Mod: S$PBB,,, | Performed by: PHYSICIAN ASSISTANT

## 2018-09-20 PROCEDURE — 99215 OFFICE O/P EST HI 40 MIN: CPT | Mod: PBBFAC | Performed by: PHYSICIAN ASSISTANT

## 2018-09-20 PROCEDURE — 99999 PR PBB SHADOW E&M-EST. PATIENT-LVL V: CPT | Mod: PBBFAC,,, | Performed by: PHYSICIAN ASSISTANT

## 2018-09-20 RX ORDER — METRONIDAZOLE 500 MG/1
500 TABLET ORAL 3 TIMES DAILY
Qty: 21 TABLET | Refills: 0 | Status: SHIPPED | OUTPATIENT
Start: 2018-09-20 | End: 2018-09-27

## 2018-09-20 RX ORDER — PANTOPRAZOLE SODIUM 20 MG/1
20 TABLET, DELAYED RELEASE ORAL
Qty: 60 TABLET | Refills: 2 | Status: SHIPPED | OUTPATIENT
Start: 2018-09-20 | End: 2018-09-25 | Stop reason: CLARIF

## 2018-09-20 RX ORDER — CALCIUM CARBONATE 600 MG
600 TABLET ORAL ONCE
COMMUNITY

## 2018-09-20 RX ORDER — CIPROFLOXACIN 500 MG/1
500 TABLET ORAL 2 TIMES DAILY
Qty: 14 TABLET | Refills: 0 | Status: SHIPPED | OUTPATIENT
Start: 2018-09-20 | End: 2018-09-27

## 2018-09-20 RX ORDER — MULTIVIT WITH MINERALS/HERBS
1 TABLET ORAL DAILY
COMMUNITY

## 2018-09-20 RX ORDER — DICYCLOMINE HYDROCHLORIDE 20 MG/1
20 TABLET ORAL EVERY 8 HOURS PRN
Qty: 90 TABLET | Refills: 0 | Status: SHIPPED | OUTPATIENT
Start: 2018-09-20 | End: 2018-10-20

## 2018-09-20 NOTE — PROGRESS NOTES
[facility] Consultation Note    Reason for Consult:  The primary encounter diagnosis was Periumbilical pain. Diagnoses of Lower abdominal pain, Diarrhea of presumed infectious origin, Epigastric pain, Gastroesophageal reflux disease without esophagitis, Abnormal ECG, Chest pain, unspecified type, and Abnormal CT scan, colon were also pertinent to this visit.    PCP:   Saud Leung   No address on file    Referring MD:  Aaareferral Self  No address on file    HPI:  This is a 58 y.o. female here for evaluation of colitis    Pain Location- epigastric, periumbilical pain, and lower abdominal pain     Onset-Beginning of Aug 2018  Intial abdominal pain started after eating seafood  Then had episode of pain after eating popeyes  Quality- pressure  Radiation- epigastric pain into the chest  Severity- 5/10  Timing- constant  + associated diarrhea    She was seen in the ED for this issue on 8/25/18  CT scan revealed Fluid filled loops of colon are seen as can be seen with diarrhea.  There is a target appearance of the descending colon which can be seen in the setting of colitis.  No evidence of diverticulitis.  Given cipro/flagyl without relief    Seen again in the ED on 9/13 and prescribed bentyl and protonix which she has been taking since  EKG in ED was abnormal    now feeling 50% better since starting the bentyl and protonix  Has a hx GERD, had been taking omprazole and pepcid prior to sx  Water stools continued up until yesterday.   yesterday had 3 loose BMs  Normal bowel habits prior to onset were constipation  She drinks protein shake which contain whey protein.  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    Surgical History:  has a past surgical history that includes Cholecystectomy; Appendectomy; Partial hymenectomy; sweat glands (Bilateral); Hysterectomy; laser to vaginal; and LASER-VAGINA (N/A, 7/8/2016).    Family History: family history includes Cancer in her sister; Diabetes in her sister; Hypertension in her father and mother..     Social History:  reports that  has never smoked. she has never used smokeless tobacco. She reports that she drinks about 0.6 oz of alcohol per week. She reports that she does not use drugs.    Review of patient's allergies indicates:  No Known Allergies    Current Outpatient Medications on File Prior to Visit   Medication Sig Dispense Refill    aluminum-magnesium hydroxide-simethicone (MAALOX ADVANCED) 200-200-20 mg/5 mL Susp Take 30 mLs by mouth 4 (four) times daily before meals and nightly. 148 mL 0    b complex vitamins tablet Take 1 tablet by mouth once daily.      bumetanide (BUMEX) 1 MG tablet TK 1 T PO  QD  2    calcium carbonate (OS-GREG) 600 mg calcium (1,500 mg) Tab Take 600 mg by mouth once.      carbamazepine (CARBATROL) 300 MG CM12 Take 300 mg by mouth 3 (three) times daily.      estradiol (ESTRACE) 0.01 % (0.1 mg/gram) vaginal cream PLACE 1 GRAM VAGINALLY 2 TIMES A WEEK 42.5 g 1    FLONASE ALLERGY RELIEF 50 mcg/actuation nasal spray SPRAY ONCE IEN QD  0    gabapentin (NEURONTIN) 300 MG capsule TK ONE C PO  QHS  5    labetalol (NORMODYNE) 100 MG tablet Take 100 mg by mouth 2 (two) times daily.      levetiracetam (KEPPRA) 1000 MG tablet Take 1,000 mg by mouth 2 (two) times daily.      lisinopril 10 MG tablet TK 1 T PO QD UTD  2    multivitamin  "capsule Take 1 capsule by mouth once daily.      ondansetron (ZOFRAN-ODT) 4 MG TbDL Take 1 tablet (4 mg total) by mouth every 6 (six) hours as needed (Nausea). 20 tablet 0    promethazine (PHENERGAN) 25 MG tablet Take 1 tablet (25 mg total) by mouth every 6 (six) hours as needed for Nausea. 15 tablet 0    simvastatin (ZOCOR) 20 MG tablet Take 20 mg by mouth every evening.      topiramate (TOPAMAX) 200 MG Tab Take 200 mg by mouth 2 (two) times daily.       topiramate (TOPAMAX) 50 MG tablet Take 50 mg by mouth 2 (two) times daily.       No current facility-administered medications on file prior to visit.          Objective Findings:    Vital Signs:  BP (!) 145/85   Pulse 72   Ht 5' 6" (1.676 m)   Wt 88 kg (194 lb 0.1 oz)   BMI 31.31 kg/m²   Body mass index is 31.31 kg/m².    Physical Exam:  General Appearance: Well appearing in no acute distress  Head:   Normocephalic, without obvious abnormality  Eyes:    No scleral icterus  ENT: Neck supple, Lips, mucosa, and tongue normal  Lungs: CTA bilaterally in anterior and posterior fields, no wheezes, no crackles.  Heart:  Regular rate and rhythm, S1, S2 normal, no murmurs heard  Abdomen: Soft, epigastric and lower abdominal tenderness, non distended with positive bowel sounds in all four quadrants.   Extremities: no edema  Skin: No rash  Neurologic: AAO x 3      Labs:  Lab Results   Component Value Date    WBC 9.01 09/13/2018    HGB 12.7 09/13/2018    HCT 37.2 09/13/2018     09/13/2018    CHOL 179 10/15/2017    TRIG 58 10/15/2017    HDL 57 10/15/2017    ALT 81 (H) 09/13/2018     (H) 09/13/2018     09/13/2018    K 3.5 09/13/2018     09/13/2018    CREATININE 1.1 09/13/2018    BUN 17 09/13/2018    CO2 28 09/13/2018    TSH 0.977 10/15/2017    INR 1.0 10/15/2017       Imaging:    Endoscopy:    12/2016 colonoscopy- hyperplastic sigmoid polyp, internal hemorrhoids. (scanned)  4/2011 colonoscopy - polyp x 3 (scanned)    4/2011 EGD - normal, Bx - HP " neg    Assessment:  1. Periumbilical pain    2. Lower abdominal pain    3. Diarrhea of presumed infectious origin    4. Epigastric pain    5. Gastroesophageal reflux disease without esophagitis    6. Abnormal ECG    7. Chest pain, unspecified type    8. Abnormal CT scan, colon       59yo F with periumbilical, lower, and epigastric abdominal pain x 6 weeks.   Left sided colitis seen on 8/29/18 CT. No relief with cipro/flagyl. She does now feel 50% better since starting protonix and bentyl. She does have Hx of GERD    Need to rule out gastric ulcers and new onset of IBD vs acute colitis with post-infectious IBS pain    Recommendations:  1. Stool studies to rule out infection  2. Another round of cipro/flagyl x 1 week  3. Increase protonix to 20mg twice daily  4. Refill bentyl 20mg   5. Schedule EGD to rule out gastric ulcers  6.Schedule colonoscopy to rule out IBD  7. Low residue diet  8 . Increase probiotic to twice daily    Follow-up in about 2 months (around 11/20/2018).      Order summary:  Orders Placed This Encounter    Stool culture    Clostridium difficile EIA    Stool Exam-Ova,Cysts,Parasites    Stool Exam-Ova,Cysts,Parasites    Stool Exam-Ova,Cysts,Parasites    Giardia / Cryptosporidum, EIA    Giardia / Cryptosporidum, EIA    Giardia / Cryptosporidum, EIA    WBC, Stool    Ambulatory Referral to Cardiology    ciprofloxacin HCl (CIPRO) 500 MG tablet    metroNIDAZOLE (FLAGYL) 500 MG tablet    pantoprazole (PROTONIX) 20 MG tablet    dicyclomine (BENTYL) 20 mg tablet    Case request GI: EGD (ESOPHAGOGASTRODUODENOSCOPY), COLONOSCOPY         Thank you so much for allowing me to participate in the care of Yanique Ortiz PA-C

## 2018-09-20 NOTE — PATIENT INSTRUCTIONS
Start taking your probiotic twice daily    Start a low residue diet    Low Residue Diet    The low residue diet is designed to reduce the residue from food that remains in the digestive tract after you eat.  Examples of foods excluded from this diet are fruits with skins and raw vegetables.  Small, frequent meals and chewing your food well is important. ______________________________________________________________________  Meats/Poultry    Fish (tender)  Beef  Chicken  Eggs/Egg Beaters  Fish/Seafood  Lamb  Fresh Pork  Shellfish  Tuna  [place]    Dairy (use w/caution)  Butter  Cream Cheese   Cottage Cheese   Cheeses   Ice cream  Milk  Yogurt     Meat/Dairy  Alternatives   [place]/rice milk Imitation meats/poultry   Peanut butter (creamy)  Soft tofu   Soy milk     Grains (w/o bran, dried fruit, nuts, raisins, seeds and whole grains  Bagels   Bread (white)  Bread sticks   Buns  Cold cereals   Dinner Rolls  English muffins  Hot cereals (cream of wheat, strained oatmeal)  Flour   Muffins   Noodles  Pancakes  Pasta  Michell bread  Rice (white only)  Tortillas (corn, flour)  Waffles    Desserts (w/o nuts, seeds, coconut or raisins, dried fruit  Cake   Cookies   Custard  Ice Cream  Jello  Pie  Popsicles  Puddings  Sherbet  Vanilla Wafers    Fruits (peeled)  Apples w/o skin  Apricots   Avocado  Bananas  Canned fruits   Cherries  Cranberries  Grapefruit  Melons  Pears  Peaches  Plums    Vegetables (cooked)  Artichokes  Asparagus (tips)  Carrots  Green beans  Potatoes w/o skin  Pumpkin  Spinach  Squash  Tomato puree/sauce  Zucchini  Wax beans    Beverages   All juices w/o pulp  Decaf Coffee  Lemonade  Tea     Seasonings/  Condiments (w/o seeds or peels or fruit skins)  All Condiments  All Oils  Jams or marmalade Butter/Margarine    Snacks  Animal crackers  Applesauce  [place] crackers  [place] toast  Rice cakes  Pretzels  Rice Cakes  Saltines

## 2018-09-21 ENCOUNTER — LAB VISIT (OUTPATIENT)
Dept: LAB | Facility: HOSPITAL | Age: 58
End: 2018-09-21
Attending: PHYSICIAN ASSISTANT
Payer: MEDICAID

## 2018-09-21 DIAGNOSIS — R19.7 DIARRHEA OF PRESUMED INFECTIOUS ORIGIN: ICD-10-CM

## 2018-09-21 LAB
C DIFF GDH STL QL: NEGATIVE
C DIFF TOX A+B STL QL IA: NEGATIVE

## 2018-09-21 PROCEDURE — 87324 CLOSTRIDIUM AG IA: CPT

## 2018-09-21 PROCEDURE — 89055 LEUKOCYTE ASSESSMENT FECAL: CPT

## 2018-09-21 PROCEDURE — 87209 SMEAR COMPLEX STAIN: CPT | Mod: 91

## 2018-09-21 PROCEDURE — 87427 SHIGA-LIKE TOXIN AG IA: CPT | Mod: 59

## 2018-09-21 PROCEDURE — 87046 STOOL CULTR AEROBIC BACT EA: CPT

## 2018-09-21 PROCEDURE — 87328 CRYPTOSPORIDIUM AG IA: CPT | Mod: 91

## 2018-09-21 PROCEDURE — 87045 FECES CULTURE AEROBIC BACT: CPT

## 2018-09-22 LAB
CRYPTOSP AG STL QL IA: NEGATIVE
G LAMBLIA AG STL QL IA: NEGATIVE
WBC #/AREA STL HPF: NORMAL /[HPF]

## 2018-09-24 ENCOUNTER — TELEPHONE (OUTPATIENT)
Dept: GASTROENTEROLOGY | Facility: CLINIC | Age: 58
End: 2018-09-24

## 2018-09-24 LAB
BACTERIA STL CULT: NORMAL
E COLI SXT1 STL QL IA: NEGATIVE
E COLI SXT2 STL QL IA: NEGATIVE

## 2018-09-25 ENCOUNTER — TELEPHONE (OUTPATIENT)
Dept: GASTROENTEROLOGY | Facility: CLINIC | Age: 58
End: 2018-09-25

## 2018-09-25 LAB
O+P STL TRI STN: NORMAL

## 2018-09-25 RX ORDER — LANSOPRAZOLE 15 MG/1
15 TABLET, ORALLY DISINTEGRATING, DELAYED RELEASE ORAL 2 TIMES DAILY
Qty: 60 TABLET | Refills: 11 | Status: SHIPPED | OUTPATIENT
Start: 2018-09-25 | End: 2018-09-27

## 2018-09-26 DIAGNOSIS — Z12.11 SPECIAL SCREENING FOR MALIGNANT NEOPLASMS, COLON: Primary | ICD-10-CM

## 2018-09-26 RX ORDER — POLYETHYLENE GLYCOL 3350, SODIUM SULFATE ANHYDROUS, SODIUM BICARBONATE, SODIUM CHLORIDE, POTASSIUM CHLORIDE 236; 22.74; 6.74; 5.86; 2.97 G/4L; G/4L; G/4L; G/4L; G/4L
4 POWDER, FOR SOLUTION ORAL ONCE
Qty: 4000 ML | Refills: 0 | Status: SHIPPED | OUTPATIENT
Start: 2018-09-26 | End: 2018-09-26

## 2018-09-27 RX ORDER — LANSOPRAZOLE 30 MG/1
30 TABLET, ORALLY DISINTEGRATING, DELAYED RELEASE ORAL DAILY
Qty: 30 TABLET | Refills: 11 | Status: SHIPPED | OUTPATIENT
Start: 2018-09-27 | End: 2018-11-19 | Stop reason: CLARIF

## 2018-09-27 RX ORDER — LANSOPRAZOLE 15 MG/1
30 TABLET, ORALLY DISINTEGRATING, DELAYED RELEASE ORAL DAILY
Qty: 30 TABLET | Refills: 11 | OUTPATIENT
Start: 2018-09-27 | End: 2019-09-27

## 2018-09-28 ENCOUNTER — TELEPHONE (OUTPATIENT)
Dept: GASTROENTEROLOGY | Facility: CLINIC | Age: 58
End: 2018-09-28

## 2018-10-02 ENCOUNTER — ANESTHESIA EVENT (OUTPATIENT)
Dept: ENDOSCOPY | Facility: HOSPITAL | Age: 58
End: 2018-10-02
Payer: MEDICAID

## 2018-10-03 ENCOUNTER — ANESTHESIA (OUTPATIENT)
Dept: ENDOSCOPY | Facility: HOSPITAL | Age: 58
End: 2018-10-03
Payer: MEDICAID

## 2018-10-03 ENCOUNTER — HOSPITAL ENCOUNTER (OUTPATIENT)
Facility: HOSPITAL | Age: 58
Discharge: HOME | End: 2018-10-03
Attending: INTERNAL MEDICINE | Admitting: INTERNAL MEDICINE
Payer: MEDICAID

## 2018-10-03 VITALS
SYSTOLIC BLOOD PRESSURE: 169 MMHG | DIASTOLIC BLOOD PRESSURE: 85 MMHG | TEMPERATURE: 98 F | BODY MASS INDEX: 31.18 KG/M2 | HEART RATE: 70 BPM | WEIGHT: 194 LBS | RESPIRATION RATE: 17 BRPM | HEIGHT: 66 IN | OXYGEN SATURATION: 99 %

## 2018-10-03 DIAGNOSIS — R19.7 DIARRHEA OF PRESUMED INFECTIOUS ORIGIN: ICD-10-CM

## 2018-10-03 PROCEDURE — E9220 PRA ENDO ANESTHESIA: HCPCS | Mod: ,,, | Performed by: NURSE ANESTHETIST, CERTIFIED REGISTERED

## 2018-10-03 PROCEDURE — 37000009 HC ANESTHESIA EA ADD 15 MINS: Performed by: INTERNAL MEDICINE

## 2018-10-03 PROCEDURE — 88342 IMHCHEM/IMCYTCHM 1ST ANTB: CPT | Mod: 26,,, | Performed by: PATHOLOGY

## 2018-10-03 PROCEDURE — 00813 ANES UPR LWR GI NDSC PX: CPT | Performed by: INTERNAL MEDICINE

## 2018-10-03 PROCEDURE — 45380 COLONOSCOPY AND BIOPSY: CPT | Performed by: INTERNAL MEDICINE

## 2018-10-03 PROCEDURE — 25000003 PHARM REV CODE 250: Performed by: ANESTHESIOLOGY

## 2018-10-03 PROCEDURE — 37000008 HC ANESTHESIA 1ST 15 MINUTES: Performed by: INTERNAL MEDICINE

## 2018-10-03 PROCEDURE — 43239 EGD BIOPSY SINGLE/MULTIPLE: CPT | Mod: 51,,, | Performed by: INTERNAL MEDICINE

## 2018-10-03 PROCEDURE — 63600175 PHARM REV CODE 636 W HCPCS: Performed by: ANESTHESIOLOGY

## 2018-10-03 PROCEDURE — 88342 IMHCHEM/IMCYTCHM 1ST ANTB: CPT | Performed by: PATHOLOGY

## 2018-10-03 PROCEDURE — 88341 IMHCHEM/IMCYTCHM EA ADD ANTB: CPT | Mod: 26,,, | Performed by: PATHOLOGY

## 2018-10-03 PROCEDURE — 25000003 PHARM REV CODE 250: Performed by: NURSE ANESTHETIST, CERTIFIED REGISTERED

## 2018-10-03 PROCEDURE — 27201012 HC FORCEPS, HOT/COLD, DISP: Performed by: INTERNAL MEDICINE

## 2018-10-03 PROCEDURE — 43239 EGD BIOPSY SINGLE/MULTIPLE: CPT | Performed by: INTERNAL MEDICINE

## 2018-10-03 PROCEDURE — 88305 TISSUE EXAM BY PATHOLOGIST: CPT | Mod: 26,,, | Performed by: PATHOLOGY

## 2018-10-03 PROCEDURE — 63600175 PHARM REV CODE 636 W HCPCS: Performed by: NURSE ANESTHETIST, CERTIFIED REGISTERED

## 2018-10-03 PROCEDURE — 45380 COLONOSCOPY AND BIOPSY: CPT | Mod: ,,, | Performed by: INTERNAL MEDICINE

## 2018-10-03 PROCEDURE — 25000003 PHARM REV CODE 250: Performed by: INTERNAL MEDICINE

## 2018-10-03 PROCEDURE — 88305 TISSUE EXAM BY PATHOLOGIST: CPT | Performed by: PATHOLOGY

## 2018-10-03 RX ORDER — LIDOCAINE HCL/PF 100 MG/5ML
SYRINGE (ML) INTRAVENOUS
Status: DISCONTINUED | OUTPATIENT
Start: 2018-10-03 | End: 2018-10-03

## 2018-10-03 RX ORDER — GLYCOPYRROLATE 0.2 MG/ML
INJECTION INTRAMUSCULAR; INTRAVENOUS
Status: DISCONTINUED | OUTPATIENT
Start: 2018-10-03 | End: 2018-10-03

## 2018-10-03 RX ORDER — PROPOFOL 10 MG/ML
VIAL (ML) INTRAVENOUS CONTINUOUS PRN
Status: DISCONTINUED | OUTPATIENT
Start: 2018-10-03 | End: 2018-10-03

## 2018-10-03 RX ORDER — FENTANYL CITRATE 50 UG/ML
50 INJECTION, SOLUTION INTRAMUSCULAR; INTRAVENOUS ONCE
Status: COMPLETED | OUTPATIENT
Start: 2018-10-03 | End: 2018-10-03

## 2018-10-03 RX ORDER — SODIUM CHLORIDE 0.9 % (FLUSH) 0.9 %
3 SYRINGE (ML) INJECTION
Status: DISCONTINUED | OUTPATIENT
Start: 2018-10-03 | End: 2018-10-03 | Stop reason: HOSPADM

## 2018-10-03 RX ORDER — ACETAMINOPHEN 325 MG/1
650 TABLET ORAL ONCE
Status: COMPLETED | OUTPATIENT
Start: 2018-10-03 | End: 2018-10-03

## 2018-10-03 RX ORDER — SODIUM CHLORIDE 9 MG/ML
INJECTION, SOLUTION INTRAVENOUS CONTINUOUS
Status: DISCONTINUED | OUTPATIENT
Start: 2018-10-03 | End: 2018-10-03 | Stop reason: HOSPADM

## 2018-10-03 RX ORDER — PROPOFOL 10 MG/ML
VIAL (ML) INTRAVENOUS
Status: DISCONTINUED | OUTPATIENT
Start: 2018-10-03 | End: 2018-10-03

## 2018-10-03 RX ADMIN — PROPOFOL 30 MG: 10 INJECTION, EMULSION INTRAVENOUS at 02:10

## 2018-10-03 RX ADMIN — FENTANYL CITRATE 50 MCG: 50 INJECTION INTRAMUSCULAR; INTRAVENOUS at 03:10

## 2018-10-03 RX ADMIN — PROPOFOL 200 MCG/KG/MIN: 10 INJECTION, EMULSION INTRAVENOUS at 02:10

## 2018-10-03 RX ADMIN — SODIUM CHLORIDE: 0.9 INJECTION, SOLUTION INTRAVENOUS at 01:10

## 2018-10-03 RX ADMIN — ACETAMINOPHEN 650 MG: 325 TABLET ORAL at 03:10

## 2018-10-03 RX ADMIN — GLYCOPYRROLATE 0.1 MG: 0.2 INJECTION, SOLUTION INTRAMUSCULAR; INTRAVENOUS at 02:10

## 2018-10-03 RX ADMIN — PROPOFOL 130 MG: 10 INJECTION, EMULSION INTRAVENOUS at 02:10

## 2018-10-03 RX ADMIN — LIDOCAINE HYDROCHLORIDE 100 MG: 20 INJECTION, SOLUTION INTRAVENOUS at 02:10

## 2018-10-03 NOTE — [employer] PATIENT INSTRUCTIONS
Discharge Summary/Instructions after an Endoscopic Procedure  Patient Name: Yanique Thomson  Patient MRN: [id number]  Patient YOB: 1960 Wednesday, October 03, 2018  Cameron Shelley MD  RESTRICTIONS:  During your procedure today, you received medications for sedation.  These   medications may affect your judgment, balance and coordination.  Therefore,   for 24 hours, you have the following restrictions:   - DO NOT drive a car, operate machinery, make legal/financial decisions,   sign important papers or drink alcohol.    ACTIVITY:  Today: no heavy lifting, straining or running due to procedural   sedation/anesthesia.  The following day: return to full activity including work.  DIET:  Eat and drink normally unless instructed otherwise.     TREATMENT FOR COMMON SIDE EFFECTS:  - Mild abdominal pain, nausea, belching, bloating or excessive gas:  rest,   eat lightly and use a heating pad.  - Sore Throat: treat with throat lozenges and/or gargle with warm salt   water.  - Because air was used during the procedure, expelling large amounts of air   from your rectum or belching is normal.  - If a bowel prep was taken, you may not have a bowel movement for 1-3 days.    This is normal.  SYMPTOMS TO WATCH FOR AND REPORT TO YOUR PHYSICIAN:  1. Abdominal pain or bloating, other than gas cramps.  2. Chest pain.  3. Back pain.  4. Signs of infection such as: chills or fever occurring within 24 hours   after the procedure.  5. Rectal bleeding, which would show as bright red, maroon, or black stools.   (A tablespoon of blood from the rectum is not serious, especially if   hemorrhoids are present.)  6. Vomiting.  7. Weakness or dizziness.  GO DIRECTLY TO THE NEAREST EMERGENCY ROOM IF YOU HAVE ANY OF THE FOLLOWING:      Difficulty breathing              Chills and/or fever over 101 F   Persistent vomiting and/or vomiting blood   Severe abdominal pain   Severe chest pain   Black, tarry stools   Bleeding- more than one  tablespoon   Any other symptom or condition that you feel may need urgent attention  Your doctor recommends these additional instructions:  If any biopsies were taken, your doctors clinic will contact you in 1 to 2   weeks with any results.  - Discharge patient to home.   - Await pathology results.   - Telephone endoscopist for pathology results in 2 weeks.   - Return to physician assistant.   - The findings and recommendations were discussed with the patient.  For questions, problems or results please call your physician - Cameron Shelley MD at Work:  [phone].  [facility], EMERGENCY ROOM PHONE NUMBER: [phone]  IF A COMPLICATION OR EMERGENCY SITUATION ARISES AND YOU ARE UNABLE TO REACH   YOUR PHYSICIAN - GO DIRECTLY TO THE EMERGENCY ROOM.  Cameron Shelley MD  10/3/2018 2:11:25 PM  This report has been verified and signed electronically.  [employer]

## 2018-10-03 NOTE — PROGRESS NOTES
Pt c/o 10/10 rectal/ hemorrhoidal pain. Dr. Karimi notified. MD ordered tylenol 650mg POX1 and fentanyl IVP 50mgX1. Will administer and continue to monitor.

## 2018-10-03 NOTE — [employer]OVATION PATIENT INSTRUCTIONS
Discharge Summary/Instructions after an Endoscopic Procedure  Patient Name: Yanique Thomson  Patient MRN: [id number]  Patient YOB: 1960 Wednesday, October 03, 2018  Cameron Shelley MD  RESTRICTIONS:  During your procedure today, you received medications for sedation.  These   medications may affect your judgment, balance and coordination.  Therefore,   for 24 hours, you have the following restrictions:   - DO NOT drive a car, operate machinery, make legal/financial decisions,   sign important papers or drink alcohol.    ACTIVITY:  Today: no heavy lifting, straining or running due to procedural   sedation/anesthesia.  The following day: return to full activity including work.  DIET:  Eat and drink normally unless instructed otherwise.     TREATMENT FOR COMMON SIDE EFFECTS:  - Mild abdominal pain, nausea, belching, bloating or excessive gas:  rest,   eat lightly and use a heating pad.  - Sore Throat: treat with throat lozenges and/or gargle with warm salt   water.  - Because air was used during the procedure, expelling large amounts of air   from your rectum or belching is normal.  - If a bowel prep was taken, you may not have a bowel movement for 1-3 days.    This is normal.  SYMPTOMS TO WATCH FOR AND REPORT TO YOUR PHYSICIAN:  1. Abdominal pain or bloating, other than gas cramps.  2. Chest pain.  3. Back pain.  4. Signs of infection such as: chills or fever occurring within 24 hours   after the procedure.  5. Rectal bleeding, which would show as bright red, maroon, or black stools.   (A tablespoon of blood from the rectum is not serious, especially if   hemorrhoids are present.)  6. Vomiting.  7. Weakness or dizziness.  GO DIRECTLY TO THE NEAREST EMERGENCY ROOM IF YOU HAVE ANY OF THE FOLLOWING:      Difficulty breathing              Chills and/or fever over 101 F   Persistent vomiting and/or vomiting blood   Severe abdominal pain   Severe chest pain   Black, tarry stools   Bleeding- more than one  tablespoon   Any other symptom or condition that you feel may need urgent attention  Your doctor recommends these additional instructions:  If any biopsies were taken, your doctors clinic will contact you in 1 to 2   weeks with any results.  - Discharge patient to home.   - Await pathology results.   - Telephone endoscopist for pathology results in 2 weeks.   - Repeat colonoscopy in 5 years for surveillance based on pathology results.     - Return to physician assistant.   - RECOMMEND YOUR SISTER WITH OVARIAN CANCER GET GENETIC TESTING FOR   HEREDITARY OVARIAN CANCER AND BARONE SYNDROME.  For questions, problems or results please call your physician - Cameron Shelley MD at Work:  [phone].  [facility], EMERGENCY ROOM PHONE NUMBER: [phone]  IF A COMPLICATION OR EMERGENCY SITUATION ARISES AND YOU ARE UNABLE TO REACH   YOUR PHYSICIAN - GO DIRECTLY TO THE EMERGENCY ROOM.  Cameron Shelley MD  10/3/2018 2:50:43 PM  This report has been verified and signed electronically.  [employer]

## 2018-10-03 NOTE — TRANSFER OF CARE
"Anesthesia Transfer of Care Note    Patient: Yanique Thomson    Procedure(s) Performed: Procedure(s) (LRB):  EGD (ESOPHAGOGASTRODUODENOSCOPY) (N/A)  COLONOSCOPY (N/A)    Patient location: PACU    Anesthesia Type: general    Transport from OR: Transported from OR on room air with adequate spontaneous ventilation    Post pain: adequate analgesia    Post assessment: no apparent anesthetic complications    Post vital signs: stable    Level of consciousness: awake, alert and oriented    Nausea/Vomiting: no nausea/vomiting    Complications: none    Transfer of care protocol was followed      Last vitals:   Visit Vitals  BP (!) 143/74 (BP Location: Left arm, Patient Position: Lying)   Pulse 70   Temp 36.6 °C (97.9 °F) (Temporal)   Resp 16   Ht 5' 6" (1.676 m)   Wt 88 kg (194 lb)   SpO2 100%   Breastfeeding? No   BMI 31.31 kg/m²     "

## 2018-10-03 NOTE — ANESTHESIA POSTPROCEDURE EVALUATION
"Anesthesia Post Evaluation    Patient: Yanique Thomson    Procedure(s) Performed: Procedure(s) (LRB):  EGD (ESOPHAGOGASTRODUODENOSCOPY) (N/A)  COLONOSCOPY (N/A)    Final Anesthesia Type: general  Patient location during evaluation: PACU  Patient participation: Yes- Able to Participate  Level of consciousness: awake and alert and oriented  Pain management: adequate  Airway patency: patent  PONV status at discharge: No PONV  Anesthetic complications: no      Cardiovascular status: blood pressure returned to baseline and hemodynamically stable  Respiratory status: unassisted  Hydration status: euvolemic  Follow-up not needed.        Visit Vitals  BP (!) 169/85   Pulse 70   Temp 36.4 °C (97.5 °F)   Resp 17   Ht 5' 6" (1.676 m)   Wt 88 kg (194 lb)   SpO2 99%   Breastfeeding? No   BMI 31.31 kg/m²       Pain/Jj Score: Pain Assessment Performed: Yes (10/3/2018  3:48 PM)  Presence of Pain: complains of pain/discomfort (10/3/2018  3:46 PM)  Pain Rating Prior to Med Admin: 4 (10/3/2018  3:48 PM)  Jj Score: 10 (10/3/2018  3:20 PM)        "

## 2018-10-03 NOTE — PROGRESS NOTES
Pt reports that pain has improved significantly 30 minutes after pain medication given. No acute distress noted. Discharge instructions reviewed, pt verbalized understanding.

## 2018-10-03 NOTE — ANESTHESIA PREPROCEDURE EVALUATION
10/03/2018  Yanique Thomson is a 58 y.o., female.  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    Seizures      Past Surgical History:   Procedure Laterality Date    APPENDECTOMY      CHOLECYSTECTOMY      HYSTERECTOMY      laser to vaginal      LASER-VAGINA N/A 7/8/2016    [facility]    PARTIAL HYMENECTOMY      sweat glands Bilateral     removial          Anesthesia Evaluation    I have reviewed the Patient Summary Reports.     I have reviewed the Medications.     Review of Systems  Anesthesia Hx:  No problems with previous Anesthesia  Neg history of prior surgery. Denies Family Hx of Anesthesia complications.   Denies Personal Hx of Anesthesia complications.   Hematology/Oncology:  Hematology Normal   Oncology Normal     EENT/Dental:EENT/Dental Normal   Cardiovascular:   Exercise tolerance: good Hypertension, well controlled    Pulmonary:  Pulmonary Normal    Renal/:  Renal/ Normal     Hepatic/GI:  Hepatic/GI Normal    Musculoskeletal:  Musculoskeletal Normal    Neurological:   Seizures, well controlled 3 years since last seizure Seizure Disorder    Endocrine:  Endocrine Normal    Dermatological:  Skin Normal    Psych:  Psychiatric Normal           Physical Exam  General:  Well nourished    Airway/Jaw/Neck:       Eyes/Ears/Nose:  EYES/EARS/NOSE FINDINGS: Normal    Chest/Lungs:  Chest/Lungs Clear    Heart/Vascular:  Heart Findings: Normal Heart murmur: negative Vascular Findings: Normal    Abdomen:  Abdomen Findings: Normal    Musculoskeletal:  Musculoskeletal Findings: Normal   Skin:  Skin Findings: Normal    Mental Status:  Mental Status Findings:  Cooperative, Alert and Oriented         Anesthesia  Plan  Type of Anesthesia, risks & benefits discussed:  Anesthesia Type:  general  Patient's Preference: general  Intra-op Monitoring Plan: standard ASA monitors  Intra-op Monitoring Plan Comments:   Post Op Pain Control Plan:   Post Op Pain Control Plan Comments:   Induction:   IV  Beta Blocker:  Patient is on a Beta-Blocker and has received one dose within the past 24 hours (No further documentation required).       Informed Consent: Patient understands risks and agrees with Anesthesia plan.  Questions answered. Anesthesia consent signed with patient.  ASA Score: 3     Day of Surgery Review of History & Physical: I have interviewed and examined the patient. I have reviewed the patient's H&P dated:  There are no significant changes.  H&P update referred to the surgeon.         Ready For Surgery From Anesthesia Perspective.

## 2018-10-03 NOTE — H&P
[facility]  History & Physical    Subjective:      Chief Complaint/Reason for Admission:     EGd and colonoscopy    Yanique Thomson is a 58 y.o. female.    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    Seizures      Past Surgical History:   Procedure Laterality Date    APPENDECTOMY      CHOLECYSTECTOMY      HYSTERECTOMY      laser to vaginal      LASER-VAGINA N/A 7/8/2016    [facility]    PARTIAL HYMENECTOMY      sweat glands Bilateral     removial      Family History   Problem Relation Age of Onset    Hypertension Father     Hypertension Mother     Diabetes Sister     Cancer Sister     Breast cancer Sister     Breast cancer Maternal Aunt     Ovarian cancer Sister      Social History     Tobacco Use    Smoking status: Never Smoker    Smokeless tobacco: Never Used   Substance Use Topics    Alcohol use: Yes     Alcohol/week: 0.6 oz     Types: 1 Glasses of wine per week     Comment: occasional    Drug use: No       PTA Medications   Medication Sig    aluminum-magnesium hydroxide-simethicone (MAALOX ADVANCED) 200-200-20 mg/5 mL Susp Take 30 mLs by mouth 4 (four) times daily before meals and nightly.    b complex vitamins tablet Take 1 tablet by mouth once daily.    bumetanide (BUMEX) 1 MG tablet TK 1 T PO  QD    calcium carbonate (OS-GREG) 600 mg calcium (1,500 mg) Tab Take 600 mg by mouth once.    carbamazepine (CARBATROL) 300 MG CM12 Take 300 mg by mouth 3 (three) times daily.    dicyclomine (BENTYL) 20 mg tablet Take 1 tablet (20 mg total) by mouth every 8 (eight) hours as needed (abdominal pain and diarrhea).    estradiol (ESTRACE) 0.01 % (0.1 mg/gram) vaginal cream PLACE 1 GRAM VAGINALLY 2 TIMES A WEEK    FLONASE ALLERGY RELIEF 50 mcg/actuation nasal spray SPRAY ONCE IEN QD     gabapentin (NEURONTIN) 300 MG capsule TK ONE C PO  QHS    labetalol (NORMODYNE) 100 MG tablet Take 100 mg by mouth 2 (two) times daily.    lansoprazole (PREVACID SOLUTAB) 30 MG disintegrating tablet Take 1 tablet (30 mg total) by mouth once daily.    levetiracetam (KEPPRA) 1000 MG tablet Take 1,000 mg by mouth 2 (two) times daily.    lisinopril 10 MG tablet TK 1 T PO QD UTD    multivitamin capsule Take 1 capsule by mouth once daily.    ondansetron (ZOFRAN-ODT) 4 MG TbDL Take 1 tablet (4 mg total) by mouth every 6 (six) hours as needed (Nausea).    promethazine (PHENERGAN) 25 MG tablet Take 1 tablet (25 mg total) by mouth every 6 (six) hours as needed for Nausea.    simvastatin (ZOCOR) 20 MG tablet Take 20 mg by mouth every evening.    topiramate (TOPAMAX) 200 MG Tab Take 200 mg by mouth 2 (two) times daily.     topiramate (TOPAMAX) 50 MG tablet Take 50 mg by mouth 2 (two) times daily.     Review of patient's allergies indicates:  No Known Allergies     Review of Systems   Constitutional: Negative for chills, fever and weight loss.   Respiratory: Negative for shortness of breath and wheezing.    Cardiovascular: Negative for chest pain.   Gastrointestinal: Positive for diarrhea.       Objective:      Vital Signs (Most Recent)  Temp: 97.9 °F (36.6 °C) (10/03/18 1242)  Pulse: 70 (10/03/18 1242)  Resp: 16 (10/03/18 1242)  BP: (!) 143/74 (10/03/18 1242)  SpO2: 100 % (10/03/18 1242)    Vital Signs Range (Last 24H):  Temp:  [97.9 °F (36.6 °C)]   Pulse:  [70]   Resp:  [16]   BP: (143)/(74)   SpO2:  [100 %]     Physical Exam   Constitutional: She appears well-developed and well-nourished.   Cardiovascular: Normal rate.   Pulmonary/Chest: Effort normal.   Abdominal: Soft.   Skin: Skin is warm and dry.   Psychiatric: She has a normal mood and affect. Her behavior is normal. Judgment and thought content normal.           Assessment:      Active Hospital Problems    Diagnosis  POA    Diarrhea of presumed infectious  origin [R19.7]  Yes      Resolved Hospital Problems   No resolved problems to display.       Plan:    Direct access EGD and Colonoscopy for GERD and diarrhea

## 2018-10-10 ENCOUNTER — TELEPHONE (OUTPATIENT)
Dept: ENDOSCOPY | Facility: HOSPITAL | Age: 58
End: 2018-10-10

## 2018-10-18 ENCOUNTER — TELEPHONE (OUTPATIENT)
Dept: GASTROENTEROLOGY | Facility: CLINIC | Age: 58
End: 2018-10-18

## 2018-10-18 NOTE — TELEPHONE ENCOUNTER
----- Message from Nikhil Melchor MA sent at 10/18/2018  3:10 PM CDT -----  Contact: self [phone]  Please advise     ----- Message -----  From: Jessica Tucker  Sent: 10/18/2018   3:04 PM  To: Angel TOVAR Staff    Test Results    Type of Test: Colonoscopy and Scope  Date of Test: 10/03  Communication Preference: self [phone]  Additional Information:

## 2018-10-31 ENCOUNTER — TELEPHONE (OUTPATIENT)
Dept: GASTROENTEROLOGY | Facility: CLINIC | Age: 58
End: 2018-10-31

## 2018-10-31 NOTE — TELEPHONE ENCOUNTER
----- Message from Cameron Shelley MD sent at 10/30/2018  4:06 PM CDT -----  Letty - please tell Yanique that her EGD and colonoscopy pathology was benign and no H. Pylori and no dysplasia and no microscopic colitis.    SPECIMEN  1) Duodenum, rule out celiac sprue.  2) Stomach, rule out H. pylori.  3) Cecal polyp, 1 mm.  4) Random colon, rule out microscopic colitis.    Supplemental Diagnosis  This supplemental report is issued for the results of ancillary studies.  2. Helicobacter pylori immunostain is negative. The controls reacted appropriately.  Diagnosed by: Opal Portillo M.D.      FINAL PATHOLOGIC DIAGNOSIS  1. Duodenum (biopsy):  Benign duodenal mucosa  Negative for active inflammation  No features of sprue  No organisms  2. Stomach (biopsy):  Antrum with minimal nonspecific chronic inflammation  Benign oxyntic mucosa  Negative for active inflammation  Pending Helicobacter pylori immunostain will be reported in an addendum  3. Colon, cecum polyp, 1 mm (biopsy):  Prominent lymphoid follicle, favor reactive (Irena Melgar and MICHELLE Prado)  Immunostains in microscopic section  4. Colon, random (biopsies):  Benign colonic mucosa  Negative for active inflammation  No features of microscopic colitis  Diagnosed by: Opal Portillo M.D.  (Electronically Signed: 2018-10-10 15:56:57)

## 2018-11-12 ENCOUNTER — TELEPHONE (OUTPATIENT)
Dept: GASTROENTEROLOGY | Facility: CLINIC | Age: 58
End: 2018-11-12

## 2018-11-19 ENCOUNTER — OFFICE VISIT (OUTPATIENT)
Dept: GASTROENTEROLOGY | Facility: CLINIC | Age: 58
End: 2018-11-19
Payer: MEDICAID

## 2018-11-19 VITALS
SYSTOLIC BLOOD PRESSURE: 140 MMHG | HEART RATE: 71 BPM | WEIGHT: 202 LBS | DIASTOLIC BLOOD PRESSURE: 85 MMHG | HEIGHT: 66 IN | BODY MASS INDEX: 32.47 KG/M2

## 2018-11-19 DIAGNOSIS — R10.84 GENERALIZED ABDOMINAL PAIN: ICD-10-CM

## 2018-11-19 DIAGNOSIS — K21.9 GASTROESOPHAGEAL REFLUX DISEASE WITHOUT ESOPHAGITIS: Primary | ICD-10-CM

## 2018-11-19 PROCEDURE — 99999 PR PBB SHADOW E&M-EST. PATIENT-LVL V: CPT | Mod: PBBFAC,,, | Performed by: PHYSICIAN ASSISTANT

## 2018-11-19 PROCEDURE — 99214 OFFICE O/P EST MOD 30 MIN: CPT | Mod: S$PBB,,, | Performed by: PHYSICIAN ASSISTANT

## 2018-11-19 PROCEDURE — 99215 OFFICE O/P EST HI 40 MIN: CPT | Mod: PBBFAC | Performed by: PHYSICIAN ASSISTANT

## 2018-11-19 RX ORDER — OMEPRAZOLE 40 MG/1
40 CAPSULE, DELAYED RELEASE ORAL EVERY MORNING
Qty: 90 CAPSULE | Refills: 3 | Status: SHIPPED | OUTPATIENT
Start: 2018-11-19 | End: 2018-11-26 | Stop reason: SDUPTHER

## 2018-11-19 RX ORDER — DICYCLOMINE HYDROCHLORIDE 20 MG/1
20 TABLET ORAL
Qty: 90 TABLET | Refills: 3 | Status: SHIPPED | OUTPATIENT
Start: 2018-11-19 | End: 2019-11-01

## 2018-11-19 NOTE — PATIENT INSTRUCTIONS
Try to ween down on taking the bentyl. In a couple of weeks try taking once daily. If ok with once daily dose, in a few week after that reduced to taking it as needed    Ween down to taking your probiotic to once daily

## 2018-11-19 NOTE — PROGRESS NOTES
[facility] Consultation Note    Reason for Consult:  The primary encounter diagnosis was Gastroesophageal reflux disease without esophagitis. A diagnosis of Generalized abdominal pain was also pertinent to this visit.    PCP:   Saud Leung       Referring MD:  No referring provider defined for this encounter.    HPI:  This is a 58 y.o. female here to follow up on her colitis   EGD - normal, HP neg  Colonosopy - tics, benign polyp x 1, Bx neg for microscopic colitis    Interval Hx  having occasional abdominal cramping with eating spicy foods or before a BM  Relief with bentyl  Diarrhea resolved, then developed constipation  Able to have BM daily now taking metamucil  Still taking the bentyl twice daily  Taking lansoprazole 30mg daily, her insurance will only cover 60day supply    9/2018 visit notes  Pain Location- epigastric, periumbilical pain, and lower abdominal pain     Onset-Beginning of Aug 2018  Intial abdominal pain started after eating seafood  Then had episode of pain after eating popeyes  Quality- pressure  Radiation- epigastric pain into the chest  Severity- 5/10  Timing- constant  + associated diarrhea    She was seen in the ED for this issue on 8/25/18  CT scan revealed Fluid filled loops of colon are seen as can be seen with diarrhea.  There is a target appearance of the descending colon which can be seen in the setting of colitis.  No evidence of diverticulitis.  Given cipro/flagyl without relief    Seen again in the ED on 9/13 and prescribed bentyl and protonix which she has been taking since  EKG in ED was abnormal    now feeling 50% better since starting the bentyl and protonix  Has a hx GERD, had been taking omprazole and pepcid prior to sx  Water stools continued up until yesterday.   yesterday had 3 loose BMs  Normal bowel habits prior to onset were constipation  She drinks protein shake which contain whey protein.  Takes a probiotic daily- digestive  advantage  Denies rectal bleeding    ROS:  Constitutional: No fevers, chills, No weight loss  ENT: No allergies  CV: No chest pain  Pulm: No cough, No shortness of breath  Ophtho: No vision changes  GI: see HPI  Derm: No rash  Heme: No lymphadenopathy, No bruising  MSK: No arthritis  : No dysuria, No hematuria  Endo: No hot or cold intolerance  Neuro: No syncope, No seizure  Psych: No anxiety, No depression    Medical History:  has a past medical history of Abscess, EP (epilepsy), GERD (gastroesophageal reflux disease), Hyperchloremia, Hypertension, Moderate vaginal dysplasia (7/5/2016), and Seizures.    Surgical History:  has a past surgical history that includes Cholecystectomy; Appendectomy; Partial hymenectomy; sweat glands (Bilateral); Hysterectomy; laser to vaginal; EGD (ESOPHAGOGASTRODUODENOSCOPY) (N/A, 10/3/2018); COLONOSCOPY (N/A, 10/3/2018); and LASER-VAGINA (N/A, 7/8/2016).    Family History: family history includes Breast cancer in her maternal aunt and sister; Cancer in her sister; Diabetes in her sister; Hypertension in her father and mother; Ovarian cancer in her sister..     Social History:  reports that  has never smoked. she has never used smokeless tobacco. She reports that she drinks about 0.6 oz of alcohol per week. She reports that she does not use drugs.    Review of patient's allergies indicates:  No Known Allergies    Current Outpatient Medications on File Prior to Visit   Medication Sig Dispense Refill    b complex vitamins tablet Take 1 tablet by mouth once daily.      bumetanide (BUMEX) 1 MG tablet TK 1 T PO  QD  2    calcium carbonate (OS-GREG) 600 mg calcium (1,500 mg) Tab Take 600 mg by mouth once.      carbamazepine (CARBATROL) 300 MG CM12 Take 300 mg by mouth 3 (three) times daily.      estradiol (ESTRACE) 0.01 % (0.1 mg/gram) vaginal cream PLACE 1 GRAM VAGINALLY 2 TIMES A WEEK 42.5 g 1    FLONASE ALLERGY RELIEF 50 mcg/actuation nasal spray SPRAY ONCE IEN QD  0    gabapentin  "(NEURONTIN) 300 MG capsule TK ONE C PO  QHS  5    labetalol (NORMODYNE) 100 MG tablet Take 100 mg by mouth 2 (two) times daily.      levetiracetam (KEPPRA) 1000 MG tablet Take 1,000 mg by mouth 2 (two) times daily.      lisinopril 10 MG tablet TK 1 T PO QD UTD  2    multivitamin capsule Take 1 capsule by mouth once daily.      ondansetron (ZOFRAN-ODT) 4 MG TbDL Take 1 tablet (4 mg total) by mouth every 6 (six) hours as needed (Nausea). 20 tablet 0    promethazine (PHENERGAN) 25 MG tablet Take 1 tablet (25 mg total) by mouth every 6 (six) hours as needed for Nausea. 15 tablet 0    simvastatin (ZOCOR) 20 MG tablet Take 20 mg by mouth every evening.      topiramate (TOPAMAX) 200 MG Tab Take 200 mg by mouth 2 (two) times daily.       topiramate (TOPAMAX) 50 MG tablet Take 50 mg by mouth 2 (two) times daily.      [DISCONTINUED] lansoprazole (PREVACID SOLUTAB) 30 MG disintegrating tablet Take 1 tablet (30 mg total) by mouth once daily. 30 tablet 11    aluminum-magnesium hydroxide-simethicone (MAALOX ADVANCED) 200-200-20 mg/5 mL Susp Take 30 mLs by mouth 4 (four) times daily before meals and nightly. 148 mL 0     No current facility-administered medications on file prior to visit.          Objective Findings:    Vital Signs:  BP (!) 140/85 Comment: pt stated she didn't take her bp medication this morning  Pulse 71   Ht 5' 6" (1.676 m)   Wt 91.6 kg (202 lb)   BMI 32.60 kg/m²   Body mass index is 32.6 kg/m².    Physical Exam:  General Appearance: Well appearing in no acute distress  Head:   Normocephalic, without obvious abnormality  Eyes:    No scleral icterus  ENT: Neck supple, Lips, mucosa, and tongue normal  Lungs: CTA bilaterally in anterior and posterior fields, no wheezes, no crackles.  Heart:  Regular rate and rhythm, S1, S2 normal, no murmurs heard  Abdomen: Soft, mild RLQ tenderness, non distended with positive bowel sounds in all four quadrants.   Extremities: no edema  Skin: No rash  Neurologic: " AAO x 3      Labs:  Lab Results   Component Value Date    WBC 9.01 09/13/2018    HGB 12.7 09/13/2018    HCT 37.2 09/13/2018     09/13/2018    CHOL 179 10/15/2017    TRIG 58 10/15/2017    HDL 57 10/15/2017    ALT 81 (H) 09/13/2018     (H) 09/13/2018     09/13/2018    K 3.5 09/13/2018     09/13/2018    CREATININE 1.1 09/13/2018    BUN 17 09/13/2018    CO2 28 09/13/2018    TSH 0.977 10/15/2017    INR 1.0 10/15/2017       Imaging:    Endoscopy:    EGD - normal, HP neg  Colonosopy - tics, benign polyp x 1, Bx neg for microscopic colitis    12/2016 colonoscopy- hyperplastic sigmoid polyp, internal hemorrhoids. (scanned)  4/2011 colonoscopy - polyp x 3 (scanned)    4/2011 EGD - normal, Bx - HP neg    Assessment:  1. Gastroesophageal reflux disease without esophagitis    2. Generalized abdominal pain       57yo F presented 2 months ago with periumbilical, lower, and epigastric abdominal pain x 6 weeks.   Left sided colitis seen on 8/29/18 CT. No relief with cipro/flagyl.Sx improved with PPI, bentyl and antibiotics.     Now feeling signifcantly better. EGD/colonoscopy were unrevealing. Occasional cramping. Long Hx of GERD which is controlled.     Recommendations:  1. Ween down to taking bentyl 20mg prn   2. Restart omeprazole 40mg after lansoprazole prescription runs out. Try to ween to 20mg at next visit  3. reduce probiotic to once daily    Follow-up in about 6 months (around 5/19/2019).      Order summary:  Orders Placed This Encounter    omeprazole (PRILOSEC) 40 MG capsule    dicyclomine (BENTYL) 20 mg tablet         Thank you so much for allowing me to participate in the care of Yanique Ortiz PA-C

## 2018-11-26 ENCOUNTER — TELEPHONE (OUTPATIENT)
Dept: GASTROENTEROLOGY | Facility: CLINIC | Age: 58
End: 2018-11-26

## 2018-11-26 RX ORDER — OMEPRAZOLE 40 MG/1
40 CAPSULE, DELAYED RELEASE ORAL EVERY MORNING
Qty: 90 CAPSULE | Refills: 3 | Status: SHIPPED | OUTPATIENT
Start: 2018-11-26 | End: 2018-11-26 | Stop reason: DRUGHIGH

## 2018-11-26 RX ORDER — OMEPRAZOLE 20 MG/1
20 CAPSULE, DELAYED RELEASE ORAL DAILY
Qty: 90 CAPSULE | Refills: 3 | Status: SHIPPED | OUTPATIENT
Start: 2018-11-26 | End: 2018-11-27 | Stop reason: CLARIF

## 2018-11-26 NOTE — TELEPHONE ENCOUNTER
----- Message from Nikhil Melchor MA sent at 11/26/2018  1:09 PM CST -----  Contact: self [phone]   Please advise refill request sent   ----- Message -----  From: Jessica Tucker  Sent: 11/26/2018  11:52 AM  To: Angel TOVAR Staff    Rx Refill/Request     Is this a Refill or New Rx:  Refill     Rx Name and Strength:  omeprazole (PRILOSEC) 20 MG capsule    Preferred Pharmacy with phone number:   [facility] [address] [place] [address] - [address]  [phone] (Phone)  [phone] (Fax)      Communication Preference: self [phone]    Additional Information: Pt states she gave the wrong dosage to Rosenda it is 20mg instead of 40 mg

## 2018-11-27 ENCOUNTER — TELEPHONE (OUTPATIENT)
Dept: GASTROENTEROLOGY | Facility: CLINIC | Age: 58
End: 2018-11-27

## 2018-11-27 RX ORDER — OMEPRAZOLE 40 MG/1
40 CAPSULE, DELAYED RELEASE ORAL EVERY MORNING
Qty: 30 CAPSULE | Refills: 5 | Status: SHIPPED | OUTPATIENT
Start: 2018-11-27 | End: 2019-01-07 | Stop reason: ALTCHOICE

## 2018-11-27 NOTE — TELEPHONE ENCOUNTER
Ma spoke to pt,    Pt stated she can stop the bentyl because that helps with the cramping and she will try the magnesium citrate

## 2018-12-24 ENCOUNTER — HOSPITAL ENCOUNTER (EMERGENCY)
Facility: HOSPITAL | Age: 58
Discharge: HOME | End: 2018-12-24
Attending: EMERGENCY MEDICINE
Payer: MEDICAID

## 2018-12-24 VITALS
RESPIRATION RATE: 17 BRPM | TEMPERATURE: 99 F | WEIGHT: 192 LBS | DIASTOLIC BLOOD PRESSURE: 68 MMHG | HEART RATE: 87 BPM | SYSTOLIC BLOOD PRESSURE: 147 MMHG | HEIGHT: 66 IN | OXYGEN SATURATION: 95 % | BODY MASS INDEX: 30.86 KG/M2

## 2018-12-24 DIAGNOSIS — J06.9 VIRAL URI WITH COUGH: Primary | ICD-10-CM

## 2018-12-24 DIAGNOSIS — R05.9 COUGH: ICD-10-CM

## 2018-12-24 LAB — DEPRECATED S PYO AG THROAT QL EIA: NEGATIVE

## 2018-12-24 PROCEDURE — 87081 CULTURE SCREEN ONLY: CPT

## 2018-12-24 PROCEDURE — 25000003 PHARM REV CODE 250: Performed by: EMERGENCY MEDICINE

## 2018-12-24 PROCEDURE — 87880 STREP A ASSAY W/OPTIC: CPT

## 2018-12-24 PROCEDURE — 99284 EMERGENCY DEPT VISIT MOD MDM: CPT

## 2018-12-24 RX ORDER — DIPHENHYDRAMINE HCL 25 MG
25 CAPSULE ORAL EVERY 6 HOURS PRN
Qty: 20 CAPSULE | Refills: 0 | Status: SHIPPED | OUTPATIENT
Start: 2018-12-24 | End: 2019-02-11

## 2018-12-24 RX ORDER — BUTALBITAL, ACETAMINOPHEN AND CAFFEINE 50; 325; 40 MG/1; MG/1; MG/1
1 TABLET ORAL EVERY 4 HOURS PRN
Qty: 20 TABLET | Refills: 0 | Status: SHIPPED | OUTPATIENT
Start: 2018-12-24 | End: 2019-01-23

## 2018-12-24 RX ORDER — ACETAMINOPHEN 500 MG
1000 TABLET ORAL
Status: COMPLETED | OUTPATIENT
Start: 2018-12-24 | End: 2018-12-24

## 2018-12-24 RX ORDER — GUAIFENESIN/DEXTROMETHORPHAN 100-10MG/5
5 SYRUP ORAL 4 TIMES DAILY PRN
Qty: 120 ML | Refills: 0 | Status: SHIPPED | OUTPATIENT
Start: 2018-12-24 | End: 2019-01-03

## 2018-12-24 RX ADMIN — ACETAMINOPHEN 1000 MG: 500 TABLET, FILM COATED ORAL at 04:12

## 2018-12-24 NOTE — DISCHARGE INSTRUCTIONS
Lots of liquids.  You may use a vaporizer.  Throat lozenges.  Tylenol Fioricet for head pain and body aches.

## 2018-12-24 NOTE — ED PROVIDER NOTES
"Encounter Date: 12/24/2018       History     Chief Complaint   Patient presents with    Sore Throat     started a week ago, "I am not sure if it is allergies"    Nasal Congestion    Headache     unable to get rid of headache, tylenol, last taken yesterday     HPI   This 58-year-old female presents to the emergency room complaining of some frontal head pain sore throat cough fever rhinorrhea and frontal head pain off and on over the course of the last 7 days.  Patient has no chest pain or shortness of breath there is no vomiting diarrhea or abdominal complaints.  The patient is otherwise well without neurologic deficit.  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    Seizures      Past Surgical History:   Procedure Laterality Date    APPENDECTOMY      CHOLECYSTECTOMY      COLONOSCOPY N/A 10/3/2018    [facility]    EGD (ESOPHAGOGASTRODUODENOSCOPY) N/A 10/3/2018    [facility] by [facility] at [facility] ([facility])    HYSTERECTOMY      laser to vaginal      LASER-VAGINA N/A 7/8/2016    Performed by Carlene [facility] at [facility] 2ND FLR    PARTIAL HYMENECTOMY      sweat glands Bilateral     removial      Family History   Problem Relation Age of Onset    Hypertension Father     Hypertension Mother     Diabetes Sister     Cancer Sister     Breast cancer Sister     Breast cancer Maternal Aunt     Ovarian cancer Sister     Colon cancer Neg Hx     Esophageal cancer Neg Hx      Social History     Tobacco Use    Smoking status: Never Smoker    Smokeless tobacco: Never Used   Substance Use Topics    Alcohol use: Yes     Alcohol/week: 0.6 oz     Types: 1 Glasses of wine per week     Comment: occasional    Drug use: No     Review of Systems  The " patient was questioned specifically with regard to the following.  General: Fever, chills, sweats. Neuro: Headache. Eyes: eye problems. ENT: Ear pain, sore throat. Cardiovascular: Chest pain. Respiratory: Cough, shortness of breath. Gastrointestinal: Abdominal pain, vomiting, diarrhea. Genitourinary: Painful urination.  Musculoskeletal: Arm and leg problems. Skin: Rash.  The review of systems was negative except for the following:  Cough sore throat headache fever chills  Physical Exam     Initial Vitals [12/24/18 0336]   BP Pulse Resp Temp SpO2   (!) 143/87 98 18 98.4 °F (36.9 °C) 98 %      MAP       --         Physical Exam  The patient was examined specifically for the following:   General:No significant distress, Good color, Warm and dry. Head and neck:Scalp atraumatic, Neck supple. Neurological:Appropriate conversation, Gross motor deficits. Eyes:Conjugate gaze, Clear corneas. ENT: No epistaxis. Cardiac: Regular rate and rhythm, Grossly normal heart tones. Pulmonary: Wheezing, Rales. Gastrointestinal: Abdominal tenderness, Abdominal distention. Musculoskeletal: Extremity deformity, Apparent pain with range of motion of the joints. Skin: Rash.   The findings on examination were normal except for the following:  The patient has a slightly red pharynx.  The patient has obvious facial congestion.  Lungs are clear.  The heart tones are normal.  The abdomen is soft.  There is no evidence of respiratory distress. The neck is supple.  Mental status examination, cranial nerves, motor and sensory examination are normal.  There is no evidence of respiratory distress  ED Course   Procedures  Labs Reviewed   THROAT SCREEN, RAPID   CULTURE, STREP A,  THROAT          Imaging Results          X-Ray Chest AP Portable (Final result)  Result time 12/24/18 06:18:42    Final result by Red Dupree MD (12/24/18 06:18:42)                 Impression:      No radiographic evidence of acute intrathoracic  process.      Electronically signed by: Red Dupree MD  Date:    12/24/2018  Time:    06:18             Narrative:    EXAMINATION:  XR CHEST AP PORTABLE    CLINICAL HISTORY:  Cough    TECHNIQUE:  Single frontal view of the chest was performed.    COMPARISON:  Chest radiograph 09/13/2018, 10/15/2017    FINDINGS:  The cardiomediastinal silhouette appears within normal limits.  The lungs appear symmetrically expanded without evidence of confluent airspace consolidation or pleural effusion.  There is no pneumothorax.  Osseous structures demonstrate stable degenerative changes.                              Medical decision making:  Given the above this patient presents to the emergency room with absolutely typical symptoms for viral URI.  Her strep screen is negative the chest x-ray is negative. I will discharge this patient on symptomatic therapy.  I will recommend Robitussin DM and ibuprofen.  I will recommend throat lozenges and vaporizer.  I will recommend lots of liquids.  I will have the patient follow up with primary care.  I doubt meningitis.  The neck is supple the headache is frontal I believe it is inflammatory from a viral infection.  There are no exudates on the tonsils there is no lymphadenopathy.  The patient has facial congestion.                          Clinical Impression:   The primary encounter diagnosis was Viral URI with cough. A diagnosis of Cough was also pertinent to this visit.                             Jeremi Castro MD  12/24/18 0636

## 2018-12-24 NOTE — ED TRIAGE NOTES
Patient reports sore throat X 1 week. Patient states pain worsen over the last 2 days. Patient states sore throat is a 10/10 at this time. Patient also reports having nasal congestion and seeing blood when blowing nose. Patient also present with cough X 4 days. Patient states she seen specks of blood in sputum.  Patient reports headache X 2 days.

## 2018-12-26 LAB — BACTERIA THROAT CULT: NORMAL

## 2019-01-06 ENCOUNTER — TELEPHONE (OUTPATIENT)
Dept: GASTROENTEROLOGY | Facility: CLINIC | Age: 59
End: 2019-01-06

## 2019-01-06 DIAGNOSIS — K21.9 GASTROESOPHAGEAL REFLUX DISEASE WITHOUT ESOPHAGITIS: Primary | ICD-10-CM

## 2019-01-07 RX ORDER — PANTOPRAZOLE SODIUM 40 MG/1
40 TABLET, DELAYED RELEASE ORAL DAILY
Qty: 30 TABLET | Refills: 5 | Status: SHIPPED | OUTPATIENT
Start: 2019-01-07 | End: 2019-05-22 | Stop reason: ALTCHOICE

## 2019-01-13 ENCOUNTER — TELEPHONE (OUTPATIENT)
Dept: GASTROENTEROLOGY | Facility: CLINIC | Age: 59
End: 2019-01-13

## 2019-01-14 NOTE — TELEPHONE ENCOUNTER
Spoke with Maris from pt pharmacy and she stated pt picked up medication (pantoprazole sodium)  so on 01/07/19 and to disregard the fax that we received from them.

## 2019-02-11 ENCOUNTER — OFFICE VISIT (OUTPATIENT)
Dept: NEUROLOGY | Facility: CLINIC | Age: 59
End: 2019-02-11
Payer: MEDICAID

## 2019-02-11 VITALS
BODY MASS INDEX: 33.94 KG/M2 | HEIGHT: 66 IN | WEIGHT: 211.19 LBS | SYSTOLIC BLOOD PRESSURE: 179 MMHG | HEART RATE: 73 BPM | DIASTOLIC BLOOD PRESSURE: 96 MMHG

## 2019-02-11 DIAGNOSIS — Z86.69 HISTORY OF SEIZURE DISORDER: Primary | ICD-10-CM

## 2019-02-11 PROCEDURE — 99214 OFFICE O/P EST MOD 30 MIN: CPT | Mod: PBBFAC,PO | Performed by: NEUROLOGICAL SURGERY

## 2019-02-11 PROCEDURE — 99999 PR PBB SHADOW E&M-EST. PATIENT-LVL IV: ICD-10-PCS | Mod: PBBFAC,,, | Performed by: NEUROLOGICAL SURGERY

## 2019-02-11 PROCEDURE — 99204 OFFICE O/P NEW MOD 45 MIN: CPT | Mod: S$PBB,,, | Performed by: NEUROLOGICAL SURGERY

## 2019-02-11 PROCEDURE — 99999 PR PBB SHADOW E&M-EST. PATIENT-LVL IV: CPT | Mod: PBBFAC,,, | Performed by: NEUROLOGICAL SURGERY

## 2019-02-11 PROCEDURE — 99204 PR OFFICE/OUTPT VISIT, NEW, LEVL IV, 45-59 MIN: ICD-10-PCS | Mod: S$PBB,,, | Performed by: NEUROLOGICAL SURGERY
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
## 2019-02-20 ENCOUNTER — TELEPHONE (OUTPATIENT)
Dept: GASTROENTEROLOGY | Facility: CLINIC | Age: 59
End: 2019-02-20

## 2019-02-28 ENCOUNTER — HOSPITAL ENCOUNTER (OUTPATIENT)
Dept: NEUROLOGY | Facility: HOSPITAL | Age: 59
Discharge: HOME | End: 2019-02-28
Attending: NEUROLOGICAL SURGERY
Payer: MEDICAID

## 2019-02-28 ENCOUNTER — HOSPITAL ENCOUNTER (OUTPATIENT)
Dept: RADIOLOGY | Facility: HOSPITAL | Age: 59
Discharge: HOME | End: 2019-02-28
Attending: NEUROLOGICAL SURGERY
Payer: MEDICAID

## 2019-02-28 DIAGNOSIS — Z86.69 HISTORY OF SEIZURE DISORDER: ICD-10-CM

## 2019-02-28 PROCEDURE — 95819 PR EEG,W/AWAKE & ASLEEP RECORD: ICD-10-PCS | Mod: 26,,, | Performed by: PSYCHIATRY & NEUROLOGY

## 2019-02-28 PROCEDURE — 70551 MRI BRAIN STEM W/O DYE: CPT | Mod: TC

## 2019-02-28 PROCEDURE — 70551 MRI BRAIN STEM W/O DYE: CPT | Mod: 26,,, | Performed by: RADIOLOGY

## 2019-02-28 PROCEDURE — 95819 EEG AWAKE AND ASLEEP: CPT | Mod: 26,,, | Performed by: PSYCHIATRY & NEUROLOGY

## 2019-02-28 PROCEDURE — 70551 MRI BRAIN EPILEPSY WITHOUT CONTRAST: ICD-10-PCS | Mod: 26,,, | Performed by: RADIOLOGY

## 2019-02-28 PROCEDURE — 95819 EEG AWAKE AND ASLEEP: CPT

## 2019-02-28 NOTE — PROCEDURES
EEG,w/awake & drowsy record  Date/Time: 2/28/2019 12:45 PM  Performed by: Tessa Jhons MD  Authorized by: Tony Estrella MD       ROUTINE ELECTROENCEPHALOGRAM REPORT      Yanique Thomson  [id number]  1960    DATE OF SERVICE: 2/28/19    REQUESTING PROVIDER: Tony Estrella MD    REASON FOR CONSULT: 59yo F with hx of seizures    METHODOLOGY   Electroencephalographic (EEG) recording is with electrodes placed according to the International 10-20 placement system.  Thirty two (32) channels of digital signal (sampling rate of 512/sec) including T1 and T2 was simultaneously recorded from the scalp and may include  EKG, EMG, and/or eye monitors.  Recording band pass was 0.1 to 512 hz.  Digital video recording of the patient is simultaneously recorded with the EEG.  The patient is instructed report clinical symptoms which may occur during the recording session.  EEG and video recording is stored and archived in digital format. Activation procedures which include photic stimulation, hyperventilation and instructing patients to perform simple task are done in selected patients.    The EEG is displayed on a monitor screen and can be reviewed using different montages.  Computer assisted analysis is employed to detect spike and electrographic seizure activity.   The entire record is submitted for computer analysis.  The entire recording is visually reviewed and the times identified by computer analysis as being spikes or seizures are reviewed again.  Compresses spectral analysis (CSA) is also performed on the activity recorded from each individual channel.  This is displayed as a power display of frequencies from 0 to 30 Hz over time.   The CSA is reviewed looking for asymmetries in power between homologous areas of the scalp and then compared with the original EEG recording.     Tellybean software was also utilized in the review of this study.  This software suite analyzes the EEG recording in multiple domains.  Coherence  and rhythmicity is computed to identify EEG sections which may contain organized seizures.  Each channel undergoes analysis to detect presence of spike and sharp waves which have special and morphological characteristic of epileptic activity.  The routine EEG recording is converted from spacial into frequency domain.  This is then displayed comparing homologous areas to identify areas of significant asymmetry.  Algorithm to identify non-cortically generated artifact is used to separate eye movement, EMG and other artifact from the EEG    EEG FINDINGS  Background activity:   The background rhythm was characterized by theta-alpha (7-8 Hz) activity with a 8 Hz posterior dominant alpha rhythm at 30-70 microvolts - during the brief wakefulness noted.  Symmetry and continuity: The background was continuous and symmetric    Sleep:   Normal sleep transients including sleep spindles, K complexes, vertex waves and POSTS were seen.    Activation procedures:   Hyperventilation was performed with no abnormalities seen  Photic stimulation was performed with no abnormalities seen    Abnormal activity:   No epileptiform discharges, periodic discharges, lateralized rhythmic delta activity or electrographic seizures were seen.    IMPRESSION:   This is an abnormal routine EEG due to the mild generalized slowing seen during the brief wakeful period.  No epileptiform activity or electrographic seizures seen.    CLINICAL CORRELATION IS RECOMMENDED.  A repeat prolonged study with more awake period is recommended if clinically indicated.    Tessa Johns MD, GINGER(), BERNARDO MCARTHUR.  Neurology-Epilepsy.  [facility].

## 2019-03-04 ENCOUNTER — TELEPHONE (OUTPATIENT)
Dept: GYNECOLOGIC ONCOLOGY | Facility: CLINIC | Age: 59
End: 2019-03-04

## 2019-03-06 ENCOUNTER — HOSPITAL ENCOUNTER (OUTPATIENT)
Dept: RADIOLOGY | Facility: OTHER | Age: 59
Discharge: HOME | End: 2019-03-06
Attending: OBSTETRICS & GYNECOLOGY
Payer: MEDICAID

## 2019-03-06 ENCOUNTER — OFFICE VISIT (OUTPATIENT)
Dept: GYNECOLOGIC ONCOLOGY | Facility: CLINIC | Age: 59
End: 2019-03-06
Payer: MEDICAID

## 2019-03-06 VITALS — BODY MASS INDEX: 33.43 KG/M2 | HEIGHT: 66 IN | WEIGHT: 208 LBS

## 2019-03-06 VITALS
HEART RATE: 73 BPM | WEIGHT: 208.56 LBS | HEIGHT: 66 IN | DIASTOLIC BLOOD PRESSURE: 89 MMHG | BODY MASS INDEX: 33.52 KG/M2 | SYSTOLIC BLOOD PRESSURE: 176 MMHG

## 2019-03-06 DIAGNOSIS — N95.2 VAGINAL ATROPHY: ICD-10-CM

## 2019-03-06 DIAGNOSIS — N89.3 VAGINAL DYSPLASIA: ICD-10-CM

## 2019-03-06 DIAGNOSIS — Z01.419 WELL WOMAN EXAM WITH ROUTINE GYNECOLOGICAL EXAM: Primary | ICD-10-CM

## 2019-03-06 DIAGNOSIS — Z01.419 WELL WOMAN EXAM WITH ROUTINE GYNECOLOGICAL EXAM: ICD-10-CM

## 2019-03-06 PROCEDURE — 99213 OFFICE O/P EST LOW 20 MIN: CPT | Mod: PBBFAC | Performed by: OBSTETRICS & GYNECOLOGY

## 2019-03-06 PROCEDURE — 99214 OFFICE O/P EST MOD 30 MIN: CPT | Mod: S$PBB,,, | Performed by: OBSTETRICS & GYNECOLOGY

## 2019-03-06 PROCEDURE — 77067 MAMMO DIGITAL SCREENING BILAT WITH TOMOSYNTHESIS_CAD: ICD-10-PCS | Mod: 26,,, | Performed by: RADIOLOGY

## 2019-03-06 PROCEDURE — 99999 PR PBB SHADOW E&M-EST. PATIENT-LVL III: ICD-10-PCS | Mod: PBBFAC,,, | Performed by: OBSTETRICS & GYNECOLOGY

## 2019-03-06 PROCEDURE — 99999 PR PBB SHADOW E&M-EST. PATIENT-LVL III: CPT | Mod: PBBFAC,,, | Performed by: OBSTETRICS & GYNECOLOGY

## 2019-03-06 PROCEDURE — 88175 CYTOPATH C/V AUTO FLUID REDO: CPT

## 2019-03-06 PROCEDURE — 99214 PR OFFICE/OUTPT VISIT, EST, LEVL IV, 30-39 MIN: ICD-10-PCS | Mod: S$PBB,,, | Performed by: OBSTETRICS & GYNECOLOGY

## 2019-03-06 PROCEDURE — 77067 SCR MAMMO BI INCL CAD: CPT | Mod: 26,,, | Performed by: RADIOLOGY

## 2019-03-06 PROCEDURE — 77063 MAMMO DIGITAL SCREENING BILAT WITH TOMOSYNTHESIS_CAD: ICD-10-PCS | Mod: 26,,, | Performed by: RADIOLOGY

## 2019-03-06 PROCEDURE — 77063 BREAST TOMOSYNTHESIS BI: CPT | Mod: 26,,, | Performed by: RADIOLOGY

## 2019-03-06 PROCEDURE — 77067 SCR MAMMO BI INCL CAD: CPT | Mod: TC

## 2019-03-06 RX ORDER — OMEPRAZOLE 40 MG/1
CAPSULE, DELAYED RELEASE ORAL
Refills: 5 | COMMUNITY
Start: 2019-02-27 | End: 2019-03-06 | Stop reason: SDUPTHER

## 2019-03-06 RX ORDER — CHLORHEXIDINE GLUCONATE ORAL RINSE 1.2 MG/ML
SOLUTION DENTAL
Refills: 0 | COMMUNITY
Start: 2019-01-31 | End: 2019-03-06

## 2019-03-06 RX ORDER — TRAMADOL HYDROCHLORIDE 50 MG/1
TABLET ORAL
COMMUNITY
End: 2019-03-06

## 2019-03-06 RX ORDER — ALPRAZOLAM 0.5 MG/1
TABLET ORAL
COMMUNITY
End: 2019-11-01

## 2019-03-06 RX ORDER — CHLORHEXIDINE GLUCONATE ORAL RINSE 1.2 MG/ML
SOLUTION DENTAL
COMMUNITY
End: 2019-03-06

## 2019-03-06 RX ORDER — ESTRADIOL 0.1 MG/G
CREAM VAGINAL
COMMUNITY
End: 2019-03-06 | Stop reason: SDUPTHER

## 2019-03-06 RX ORDER — TOPIRAMATE 50 MG/1
TABLET, FILM COATED ORAL
COMMUNITY
End: 2019-03-06 | Stop reason: SDUPTHER

## 2019-03-06 RX ORDER — TOPIRAMATE 200 MG/1
TABLET ORAL
COMMUNITY
End: 2019-03-06 | Stop reason: SDUPTHER

## 2019-03-06 RX ORDER — LABETALOL 100 MG/1
TABLET, FILM COATED ORAL
COMMUNITY
End: 2019-03-06 | Stop reason: SDUPTHER

## 2019-03-06 RX ORDER — METRONIDAZOLE 7.5 MG/G
GEL VAGINAL
COMMUNITY
End: 2019-03-06

## 2019-03-06 RX ORDER — PROMETHAZINE HYDROCHLORIDE 25 MG/1
TABLET ORAL
COMMUNITY
End: 2019-03-06 | Stop reason: SDUPTHER

## 2019-03-06 RX ORDER — BENZONATATE 100 MG/1
CAPSULE ORAL
COMMUNITY
End: 2019-11-01

## 2019-03-06 RX ORDER — GABAPENTIN 300 MG/1
CAPSULE ORAL
COMMUNITY
End: 2019-03-06 | Stop reason: SDUPTHER

## 2019-03-06 RX ORDER — LANSOPRAZOLE 30 MG/1
TABLET, ORALLY DISINTEGRATING, DELAYED RELEASE ORAL
Refills: 11 | COMMUNITY
Start: 2019-02-25 | End: 2019-03-06 | Stop reason: SDUPTHER

## 2019-03-06 RX ORDER — LEVOFLOXACIN 500 MG/1
TABLET, FILM COATED ORAL
COMMUNITY
End: 2019-03-06

## 2019-03-06 RX ORDER — AMOXICILLIN AND CLAVULANATE POTASSIUM 875; 125 MG/1; MG/1
TABLET, FILM COATED ORAL
COMMUNITY
End: 2019-03-06

## 2019-03-06 RX ORDER — CARBAMAZEPINE 300 MG/1
CAPSULE, EXTENDED RELEASE ORAL
COMMUNITY
End: 2019-03-06 | Stop reason: SDUPTHER

## 2019-03-06 RX ORDER — GUAIFENESIN AND DEXTROMETHORPHAN HYDROBROMIDE 600; 30 MG/1; MG/1
TABLET, EXTENDED RELEASE ORAL
Refills: 0 | COMMUNITY
Start: 2019-01-31 | End: 2019-03-06

## 2019-03-06 RX ORDER — BUMETANIDE 1 MG/1
TABLET ORAL
COMMUNITY
End: 2019-03-06 | Stop reason: SDUPTHER

## 2019-03-06 RX ORDER — LEVETIRACETAM 1000 MG/1
TABLET ORAL
COMMUNITY
End: 2019-03-06

## 2019-03-06 RX ORDER — OMEPRAZOLE 40 MG/1
CAPSULE, DELAYED RELEASE ORAL
COMMUNITY
End: 2019-05-22 | Stop reason: ALTCHOICE

## 2019-03-06 RX ORDER — FLAVOXATE HYDROCHLORIDE 100 MG/1
TABLET ORAL
COMMUNITY
End: 2019-03-06

## 2019-03-06 RX ORDER — METRONIDAZOLE 500 MG/1
TABLET ORAL
COMMUNITY
End: 2019-03-06

## 2019-03-06 RX ORDER — LISINOPRIL 10 MG/1
TABLET ORAL
COMMUNITY
End: 2019-03-06 | Stop reason: SDUPTHER

## 2019-03-06 RX ORDER — PANTOPRAZOLE SODIUM 20 MG/1
TABLET, DELAYED RELEASE ORAL
COMMUNITY
End: 2019-03-06

## 2019-03-06 RX ORDER — OSELTAMIVIR PHOSPHATE 45 MG/1
CAPSULE ORAL
COMMUNITY
End: 2019-03-06

## 2019-03-06 RX ORDER — LEVETIRACETAM 500 MG/1
TABLET, FILM COATED ORAL
Refills: 5 | COMMUNITY
Start: 2019-02-12 | End: 2019-04-18 | Stop reason: SDUPTHER

## 2019-03-06 RX ORDER — ESTRADIOL 0.1 MG/G
CREAM VAGINAL
Qty: 42.5 G | Refills: 1 | Status: SHIPPED | OUTPATIENT
Start: 2019-03-06

## 2019-03-06 RX ORDER — ALPRAZOLAM 0.5 MG/1
TABLET ORAL
Refills: 0 | COMMUNITY
Start: 2019-01-31 | End: 2019-03-06 | Stop reason: SDUPTHER

## 2019-03-06 RX ORDER — POLYETHYLENE GLYCOL 3350, SODIUM SULFATE ANHYDROUS, SODIUM BICARBONATE, SODIUM CHLORIDE, POTASSIUM CHLORIDE 236; 22.74; 6.74; 5.86; 2.97 G/4L; G/4L; G/4L; G/4L; G/4L
POWDER, FOR SOLUTION ORAL
COMMUNITY
End: 2019-03-06 | Stop reason: ALTCHOICE

## 2019-03-06 RX ORDER — FLUTICASONE PROPIONATE 50 MCG
SPRAY, SUSPENSION (ML) NASAL
COMMUNITY
End: 2019-03-06 | Stop reason: SDUPTHER

## 2019-03-06 RX ORDER — FAMOTIDINE 20 MG/1
TABLET, FILM COATED ORAL
Refills: 0 | COMMUNITY
Start: 2019-02-23 | End: 2020-12-20

## 2019-03-06 RX ORDER — DOXYCYCLINE 100 MG/1
CAPSULE ORAL
Refills: 0 | COMMUNITY
Start: 2019-01-31 | End: 2019-03-06

## 2019-03-06 RX ORDER — SULFAMETHOXAZOLE AND TRIMETHOPRIM 800; 160 MG/1; MG/1
TABLET ORAL
COMMUNITY
End: 2019-03-06

## 2019-03-06 RX ORDER — BUTALBITAL, ACETAMINOPHEN AND CAFFEINE 50; 325; 40 MG/1; MG/1; MG/1
TABLET ORAL
COMMUNITY
End: 2019-03-06

## 2019-03-06 RX ORDER — DIPHENHYDRAMINE HCL 25 MG
CAPSULE ORAL
COMMUNITY
End: 2019-03-06

## 2019-03-06 RX ORDER — OXYCODONE AND ACETAMINOPHEN 5; 325 MG/1; MG/1
TABLET ORAL
COMMUNITY
End: 2019-03-06

## 2019-03-06 RX ORDER — CEPHALEXIN 500 MG/1
CAPSULE ORAL
COMMUNITY
End: 2019-03-06

## 2019-03-06 RX ORDER — ONDANSETRON 4 MG/1
TABLET, FILM COATED ORAL
COMMUNITY
End: 2020-03-09

## 2019-03-06 RX ORDER — DIPHENOXYLATE HYDROCHLORIDE AND ATROPINE SULFATE 2.5; .025 MG/1; MG/1
TABLET ORAL
COMMUNITY
End: 2019-03-06

## 2019-03-06 RX ORDER — SIMVASTATIN 20 MG/1
TABLET, FILM COATED ORAL
COMMUNITY
End: 2019-03-06 | Stop reason: SDUPTHER

## 2019-03-06 RX ORDER — LEVETIRACETAM 500 MG/1
TABLET ORAL
COMMUNITY
End: 2019-03-06 | Stop reason: SDUPTHER

## 2019-03-06 RX ORDER — PROMETHAZINE HYDROCHLORIDE 25 MG/1
SUPPOSITORY RECTAL
COMMUNITY
End: 2019-03-06

## 2019-03-06 RX ORDER — DOXYCYCLINE 100 MG/1
CAPSULE ORAL
COMMUNITY
End: 2019-03-06

## 2019-03-06 RX ORDER — DICYCLOMINE HYDROCHLORIDE 20 MG/1
TABLET ORAL
COMMUNITY
End: 2019-03-06 | Stop reason: SDUPTHER

## 2019-03-06 RX ORDER — CEFADROXIL 500 MG/1
CAPSULE ORAL
COMMUNITY
End: 2019-03-06

## 2019-03-06 RX ORDER — CIPROFLOXACIN 500 MG/1
TABLET ORAL
COMMUNITY
End: 2019-03-06

## 2019-03-06 RX ORDER — PHENAZOPYRIDINE HYDROCHLORIDE 200 MG/1
TABLET, FILM COATED ORAL
COMMUNITY
End: 2019-03-06

## 2019-03-06 RX ORDER — CLOTRIMAZOLE 1 %
CREAM (GRAM) TOPICAL
COMMUNITY
End: 2019-03-06

## 2019-03-06 RX ORDER — FAMOTIDINE 20 MG/1
TABLET, FILM COATED ORAL
COMMUNITY
End: 2019-03-06 | Stop reason: SDUPTHER

## 2019-03-06 RX ORDER — OSELTAMIVIR PHOSPHATE 75 MG/1
CAPSULE ORAL
COMMUNITY
End: 2019-03-06

## 2019-03-06 RX ORDER — LANSOPRAZOLE 30 MG/1
TABLET, ORALLY DISINTEGRATING, DELAYED RELEASE ORAL
COMMUNITY
End: 2019-05-22 | Stop reason: SDUPTHER

## 2019-03-06 RX ORDER — PANTOPRAZOLE SODIUM 40 MG/1
TABLET, DELAYED RELEASE ORAL
COMMUNITY
End: 2019-03-06 | Stop reason: SDUPTHER

## 2019-03-06 NOTE — PROGRESS NOTES
Subjective:      Patient ID: Yanique Thomson is a 58 y.o. female.    Chief Complaint: Vaginal Dysplasia (1 year follow up )      HPI  Patient is a 56yp female who originally presented as a referral from Dr. Addis Galeana for moderate vaginal dysplasia. 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      Follow up pap 1/16/17 negative for dysplasia. Repeat pap 7/2017 negative.       An outside provider performed a pap smear in 1/2018 showing LSIL, negative HPV. Exam normal.   Vaginal estrogen.    Presents today for follow up. Without complaints or concerns. No vaginal bleeding.     Review of Systems   Constitutional: Negative for appetite change, chills, fatigue and fever.   HENT: Negative for mouth sores.    Respiratory: Negative for cough and shortness of breath.    Cardiovascular: Negative for leg swelling.   Gastrointestinal: Negative for abdominal pain, blood in stool, constipation and diarrhea.   Endocrine: Negative for cold intolerance.   Genitourinary: Negative for dysuria and vaginal bleeding.   Musculoskeletal: Negative for myalgias.   Skin: Negative for rash.   Allergic/Immunologic: Negative.    Neurological: Negative for weakness and numbness.   Hematological: Negative for adenopathy. Does not bruise/bleed easily.   Psychiatric/Behavioral: Negative for confusion.       Objective:   Physical Exam:   Constitutional: She is oriented to person, place, and time. She appears well-developed and well-nourished.    HENT:   Head: Normocephalic and atraumatic.    Eyes: EOM are normal. Pupils are equal, round, and reactive to light.    Neck: Normal range of motion. Neck supple. No thyromegaly present.    Cardiovascular: Normal rate, regular rhythm and intact distal pulses.     Pulmonary/Chest: Effort normal and breath sounds normal. No respiratory distress. She has no wheezes.         Abdominal: Soft. Bowel sounds are normal. She exhibits no distension, no ascites and no mass. There is no tenderness.     Genitourinary: Rectum normal, vagina normal and uterus normal. Pelvic exam was performed with patient supine. There is no lesion on the right labia. There is no lesion on the left labia. Vaginal cuff normal.Cervix exhibits absence.   Genitourinary Comments: No visible lesions or palpable abnormalities.            Musculoskeletal: Normal range of motion and moves all extremeties.      Lymphadenopathy:     She has no cervical adenopathy.        Right: No inguinal and no supraclavicular adenopathy present.        Left: No inguinal and no supraclavicular adenopathy present.    Neurological: She is alert and oriented to person, place, and time.    Skin: Skin is warm and dry. No rash noted.    Psychiatric: She has a normal mood and affect.       Assessment:     1. Well woman exam with routine gynecological exam    2. Vaginal dysplasia    3. Vaginal atrophy        Plan:     Orders Placed This Encounter   Procedures    Mammo Digital Screening Bilat w/ Reese     No visible lesions on exam.   Surveillance vaginal pap collected today. If low grade or less will continue to monitor.   RTC 6-12 months or sooner if needed.   Continue use of vaginal estrogen cream,

## 2019-03-11 ENCOUNTER — TELEPHONE (OUTPATIENT)
Dept: GYNECOLOGIC ONCOLOGY | Facility: CLINIC | Age: 59
End: 2019-03-11

## 2019-03-11 NOTE — TELEPHONE ENCOUNTER
"Spoke with pt. Pt states she has a white discharge with no odor staining underwear. Per the pt " she thinks it's a yeast infection". Per Dr. holguin pt informed she can try OTC monistat, if this does not work to contact the office and physician will send in difflucan to pharmacy. Pt voiced understanding   "

## 2019-04-18 ENCOUNTER — OFFICE VISIT (OUTPATIENT)
Dept: NEUROLOGY | Facility: CLINIC | Age: 59
End: 2019-04-18
Payer: MEDICAID

## 2019-04-18 VITALS
DIASTOLIC BLOOD PRESSURE: 87 MMHG | WEIGHT: 208 LBS | BODY MASS INDEX: 33.43 KG/M2 | SYSTOLIC BLOOD PRESSURE: 161 MMHG | HEART RATE: 77 BPM | HEIGHT: 66 IN

## 2019-04-18 DIAGNOSIS — Z86.69 HISTORY OF SEIZURE DISORDER: Primary | ICD-10-CM

## 2019-04-18 PROCEDURE — 99214 PR OFFICE/OUTPT VISIT, EST, LEVL IV, 30-39 MIN: ICD-10-PCS | Mod: S$PBB,,, | Performed by: NEUROLOGICAL SURGERY

## 2019-04-18 PROCEDURE — 99214 OFFICE O/P EST MOD 30 MIN: CPT | Mod: S$PBB,,, | Performed by: NEUROLOGICAL SURGERY

## 2019-04-18 PROCEDURE — 99999 PR PBB SHADOW E&M-EST. PATIENT-LVL III: ICD-10-PCS | Mod: PBBFAC,,, | Performed by: NEUROLOGICAL SURGERY

## 2019-04-18 PROCEDURE — 99999 PR PBB SHADOW E&M-EST. PATIENT-LVL III: CPT | Mod: PBBFAC,,, | Performed by: NEUROLOGICAL SURGERY

## 2019-04-18 PROCEDURE — 99213 OFFICE O/P EST LOW 20 MIN: CPT | Mod: PBBFAC | Performed by: NEUROLOGICAL SURGERY

## 2019-04-18 RX ORDER — TOPIRAMATE 200 MG/1
200 TABLET ORAL 2 TIMES DAILY
Qty: 60 TABLET | Refills: 11 | Status: SHIPPED | OUTPATIENT
Start: 2019-04-18 | End: 2020-05-14

## 2019-04-18 RX ORDER — CARBAMAZEPINE 300 MG/1
300 CAPSULE, EXTENDED RELEASE ORAL 3 TIMES DAILY
Qty: 90 CAPSULE | Refills: 11 | Status: SHIPPED | OUTPATIENT
Start: 2019-04-18 | End: 2021-04-16 | Stop reason: SDUPTHER

## 2019-04-18 RX ORDER — TOPIRAMATE 50 MG/1
50 TABLET, FILM COATED ORAL 2 TIMES DAILY
Qty: 60 TABLET | Refills: 11 | Status: SHIPPED | OUTPATIENT
Start: 2019-04-18 | End: 2020-04-17

## 2019-04-18 RX ORDER — LEVETIRACETAM 500 MG/1
1000 TABLET, FILM COATED ORAL 2 TIMES DAILY
Qty: 120 TABLET | Refills: 11 | Status: SHIPPED | OUTPATIENT
Start: 2019-04-18 | End: 2021-04-16 | Stop reason: SDUPTHER
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
## 2019-04-26 ENCOUNTER — HOSPITAL ENCOUNTER (EMERGENCY)
Facility: HOSPITAL | Age: 59
Discharge: HOME | End: 2019-04-26
Attending: EMERGENCY MEDICINE
Payer: MEDICAID

## 2019-04-26 VITALS
SYSTOLIC BLOOD PRESSURE: 153 MMHG | HEART RATE: 75 BPM | BODY MASS INDEX: 32.8 KG/M2 | TEMPERATURE: 99 F | DIASTOLIC BLOOD PRESSURE: 93 MMHG | HEIGHT: 67 IN | WEIGHT: 209 LBS | RESPIRATION RATE: 17 BRPM | OXYGEN SATURATION: 98 %

## 2019-04-26 DIAGNOSIS — K12.1 MOUTH ULCERATION: Primary | ICD-10-CM

## 2019-04-26 PROCEDURE — 99282 EMERGENCY DEPT VISIT SF MDM: CPT | Mod: ER

## 2019-04-26 PROCEDURE — 25000003 PHARM REV CODE 250: Mod: ER | Performed by: NURSE PRACTITIONER

## 2019-04-26 RX ADMIN — LIDOCAINE HYDROCHLORIDE: 20 SOLUTION ORAL; TOPICAL at 09:04
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
## 2019-04-27 NOTE — ED NOTES
Pt discharged per MD orders. Pt educated and encouraged to return to ER if current symptoms worsen or new symptoms occur. Pt demonstrates understanding of discharge paperwork which may include medication prescriptions and follow up instructions.

## 2019-05-22 ENCOUNTER — TELEPHONE (OUTPATIENT)
Dept: GASTROENTEROLOGY | Facility: CLINIC | Age: 59
End: 2019-05-22

## 2019-05-22 DIAGNOSIS — K21.9 GASTROESOPHAGEAL REFLUX DISEASE WITHOUT ESOPHAGITIS: Primary | ICD-10-CM

## 2019-05-22 RX ORDER — LANSOPRAZOLE 30 MG/1
30 CAPSULE, DELAYED RELEASE ORAL DAILY
Qty: 30 CAPSULE | Refills: 5 | Status: SHIPPED | OUTPATIENT
Start: 2019-05-22 | End: 2019-11-01

## 2019-08-22 ENCOUNTER — HOSPITAL ENCOUNTER (EMERGENCY)
Facility: HOSPITAL | Age: 59
Discharge: HOME | End: 2019-08-22
Attending: INTERNAL MEDICINE
Payer: MEDICAID

## 2019-08-22 VITALS
WEIGHT: 202 LBS | BODY MASS INDEX: 31.71 KG/M2 | RESPIRATION RATE: 18 BRPM | OXYGEN SATURATION: 97 % | DIASTOLIC BLOOD PRESSURE: 84 MMHG | HEART RATE: 84 BPM | TEMPERATURE: 98 F | SYSTOLIC BLOOD PRESSURE: 158 MMHG | HEIGHT: 67 IN

## 2019-08-22 DIAGNOSIS — K08.89 PAIN, DENTAL: Primary | ICD-10-CM

## 2019-08-22 PROCEDURE — 99283 EMERGENCY DEPT VISIT LOW MDM: CPT | Mod: ER

## 2019-08-22 RX ORDER — ACETAMINOPHEN AND CODEINE PHOSPHATE 300; 30 MG/1; MG/1
1 TABLET ORAL EVERY 6 HOURS PRN
Qty: 8 TABLET | Refills: 0 | Status: SHIPPED | OUTPATIENT
Start: 2019-08-22 | End: 2019-08-30

## 2019-08-22 RX ORDER — IBUPROFEN 600 MG/1
600 TABLET ORAL EVERY 6 HOURS PRN
Qty: 30 TABLET | Refills: 0 | Status: SHIPPED | OUTPATIENT
Start: 2019-08-22 | End: 2019-11-01

## 2019-08-23 NOTE — ED PROVIDER NOTES
Encounter Date: 8/22/2019       History     Chief Complaint   Patient presents with    Dental Pain     pt c/o right and left upper mouth pain x 1 week after having 2 wisdom teeth removed. pt reports she has been taking ibuprofen but denies any relief     A 59-year-old nontoxic female who presents to the ED with her complaints of right in upper dental pain for 1 week.  Patient reports having wisdom teeth removed 1 week ago.  Patient has been taking 400 mg of ibuprofen with no relief.  Patient states she had her teeth extracted at [facility].  Patient states she was not given pain medicine and ibuprofen is not helping. Denies fever or chills. Patient reports that she attempted to call her dentist but she did not get any return phone calls.    The history is provided by the patient.   Dental Pain   The primary symptoms include mouth pain. Primary symptoms do not include fever or shortness of breath. The symptoms began several days ago. The symptoms are worsening.   Mouth pain began 5 - 7 days ago. Mouth pain occurs intermittently. At its highest the mouth pain was at 9/10.     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    Seizures      Past Surgical History:   Procedure Laterality Date    APPENDECTOMY      CHOLECYSTECTOMY      COLONOSCOPY N/A 10/3/2018    Performed by [facility] at [facility] ENDO (4TH FLR)    EGD (ESOPHAGOGASTRODUODENOSCOPY) N/A 10/3/2018    Performed by [facility] at [facility] ENDO (4TH FLR)    HYSTERECTOMY      laser to vaginal      LASER-VAGINA N/A 7/8/2016    Performed by [facility] at [facility] OR 2ND FLR    PARTIAL HYMENECTOMY      sweat glands Bilateral     removial      Family History   Problem Relation Age of Onset    Hypertension Father     Hypertension Mother      Diabetes Sister     Cancer Sister     Breast cancer Sister 43        aprox    Breast cancer Maternal Aunt     Ovarian cancer Sister 43        aprox    Colon cancer Neg Hx     Esophageal cancer Neg Hx      Social History     Tobacco Use    Smoking status: Never Smoker    Smokeless tobacco: Never Used   Substance Use Topics    Alcohol use: Yes     Alcohol/week: 0.6 oz     Types: 1 Glasses of wine per week     Comment: occasional    Drug use: No     Review of Systems   Constitutional: Negative.  Negative for fever.   HENT: Positive for dental problem.    Eyes: Negative.    Respiratory: Negative.  Negative for shortness of breath.    Cardiovascular: Negative.  Negative for chest pain.   Gastrointestinal: Negative.    Endocrine: Negative.    Genitourinary: Negative.    Musculoskeletal: Negative.    Skin: Negative.    Allergic/Immunologic: Negative.    Neurological: Negative.    Hematological: Negative.    All other systems reviewed and are negative.      Physical Exam     Initial Vitals [08/1960]   BP Pulse Resp Temp SpO2   (!) 176/93 83 18 98.3 °F (36.8 °C) 97 %      MAP       --         Physical Exam    Nursing note and vitals reviewed.  Constitutional: Vital signs are normal. She appears well-developed. She is cooperative. She does not appear ill.   HENT:   Right Ear: External ear normal.   Left Ear: External ear normal.   Mouth/Throat: Oropharynx is clear and moist and mucous membranes are normal. No uvula swelling.   Upper wisdom teeth missing. No evidence of abscess. No evidence of infection. Tenderness noted   Eyes: Conjunctivae and lids are normal.   Neck: Normal range of motion. Neck supple.   Cardiovascular: Normal rate, regular rhythm, S1 normal, S2 normal and normal heart sounds. Exam reveals no gallop.    No murmur heard.  Pulmonary/Chest: Effort normal and breath sounds normal. No respiratory distress.   Abdominal: Soft. Normal appearance. There is no tenderness.   Musculoskeletal: Normal  range of motion.   Neurological: She is alert and oriented to person, place, and time.   Skin: Skin is warm, dry and intact.   Psychiatric: She has a normal mood and affect. Her speech is normal. Thought content normal.         ED Course   Procedures  Labs Reviewed - No data to display       Imaging Results    None          Medical Decision Making:   Initial Assessment:   A 59-year-old nontoxic female who presents to the ED with her complaints of right in upper dental pain for 1 week.  Patient reports having wisdom teeth removed 1 week ago.  Patient has been taking 400 mg of ibuprofen with no relief.  Patient states she had her teeth extracted at [facility].  Patient states she was not given pain medicine and ibuprofen is not helping.    Differential Diagnosis:   Dental abscess, dental pain   ED Management:  Discharge home with Motrin and Tylenol with codeine.   Follow-up with dentist ASAP.   Return ED for worsening of symptoms.                      Clinical Impression:       ICD-10-CM ICD-9-CM   1. Pain, dental K08.89 525.9                                RIP Nava  08/22/19 2128

## 2019-10-30 ENCOUNTER — TELEPHONE (OUTPATIENT)
Dept: NEUROLOGY | Facility: CLINIC | Age: 59
End: 2019-10-30
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
## 2019-11-01 ENCOUNTER — OFFICE VISIT (OUTPATIENT)
Dept: NEUROLOGY | Facility: CLINIC | Age: 59
End: 2019-11-01
Payer: MEDICAID

## 2019-11-01 VITALS
BODY MASS INDEX: 32.8 KG/M2 | WEIGHT: 209 LBS | DIASTOLIC BLOOD PRESSURE: 93 MMHG | HEIGHT: 67 IN | HEART RATE: 71 BPM | SYSTOLIC BLOOD PRESSURE: 167 MMHG

## 2019-11-01 DIAGNOSIS — R51.9 NONINTRACTABLE HEADACHE, UNSPECIFIED CHRONICITY PATTERN, UNSPECIFIED HEADACHE TYPE: ICD-10-CM

## 2019-11-01 DIAGNOSIS — Z86.69 HISTORY OF SEIZURE DISORDER: Primary | ICD-10-CM

## 2019-11-01 PROCEDURE — 99999 PR PBB SHADOW E&M-EST. PATIENT-LVL III: CPT | Mod: PBBFAC,,, | Performed by: NEUROLOGICAL SURGERY

## 2019-11-01 PROCEDURE — 99999 PR PBB SHADOW E&M-EST. PATIENT-LVL III: ICD-10-PCS | Mod: PBBFAC,,, | Performed by: NEUROLOGICAL SURGERY

## 2019-11-01 PROCEDURE — 99214 OFFICE O/P EST MOD 30 MIN: CPT | Mod: S$PBB,,, | Performed by: NEUROLOGICAL SURGERY

## 2019-11-01 PROCEDURE — 99213 OFFICE O/P EST LOW 20 MIN: CPT | Mod: PBBFAC | Performed by: NEUROLOGICAL SURGERY

## 2019-11-01 PROCEDURE — 99214 PR OFFICE/OUTPT VISIT, EST, LEVL IV, 30-39 MIN: ICD-10-PCS | Mod: S$PBB,,, | Performed by: NEUROLOGICAL SURGERY
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
## 2019-12-26 ENCOUNTER — TELEPHONE (OUTPATIENT)
Dept: NEUROLOGY | Facility: CLINIC | Age: 59
End: 2019-12-26

## 2020-01-14 NOTE — PROGRESS NOTES
Transport transported patient to son's vehicle via wheelchair. No falls or harm noted.    [Total Preg ___] : G[unfilled] [Postmenopausal] : The patient is postmenopausal [Live Births ___] : P[unfilled]

## 2020-01-24 ENCOUNTER — HOSPITAL ENCOUNTER (EMERGENCY)
Facility: HOSPITAL | Age: 60
Discharge: HOME | End: 2020-01-24
Attending: EMERGENCY MEDICINE
Payer: MEDICAID

## 2020-01-24 VITALS
WEIGHT: 202 LBS | HEIGHT: 67 IN | OXYGEN SATURATION: 99 % | TEMPERATURE: 99 F | RESPIRATION RATE: 20 BRPM | HEART RATE: 76 BPM | SYSTOLIC BLOOD PRESSURE: 185 MMHG | DIASTOLIC BLOOD PRESSURE: 92 MMHG | BODY MASS INDEX: 31.71 KG/M2

## 2020-01-24 DIAGNOSIS — R51.9 SINUS HEADACHE: Primary | ICD-10-CM

## 2020-01-24 PROCEDURE — 99284 EMERGENCY DEPT VISIT MOD MDM: CPT | Mod: ER

## 2020-01-24 RX ORDER — PREDNISONE 10 MG/1
10 TABLET ORAL DAILY
Qty: 5 TABLET | Refills: 0 | Status: SHIPPED | OUTPATIENT
Start: 2020-01-24 | End: 2020-01-29

## 2020-01-24 RX ORDER — BUTALBITAL, ACETAMINOPHEN AND CAFFEINE 50; 325; 40 MG/1; MG/1; MG/1
1 TABLET ORAL EVERY 4 HOURS PRN
Qty: 20 TABLET | Refills: 0 | Status: SHIPPED | OUTPATIENT
Start: 2020-01-24 | End: 2020-02-23

## 2020-01-25 NOTE — DISCHARGE INSTRUCTIONS
Afrin 1 sprayTo each nostril this evening only  Tylenol and Motrin for any pain or fever in addition to medications prescribed

## 2020-01-25 NOTE — ED PROVIDER NOTES
Encounter Date: 1/24/2020    SCRIBE #1 NOTE: I, Jaswant Epperson, am scribing for, and in the presence of,  Dr. Oglesby. I have scribed the following portions of the note - Other sections scribed: HPI, ROS, PE.       History     Chief Complaint   Patient presents with    Sinus Problem     PT REPORTS SINUS HEADACHE, CONGESTION AND SORE THROAT FOR 3 DAYS WITHOUT RELIEF FROM OTC TYLENOL SINUS MED, LAST DOSE AT NOON TODAY     Yanique Thomson is a 59 y.o. female with history of HTN, seizures, EP, hyperchloremia, and GERD who presents to the ED complaining of sinus pain, headache, congestion, sore throat, chills, and postnasal drip for 3 days. Patient has been taking tylenol and sinus medication without any relief.    The history is provided by the patient. No  was used.     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    Seizures      Past Surgical History:   Procedure Laterality Date    APPENDECTOMY      CHOLECYSTECTOMY      COLONOSCOPY N/A 10/3/2018    Procedure: COLONOSCOPY;  Surgeon: Cameron Shelley MD;  Location: [address]);  Service: Endoscopy;  Laterality: N/A;    ESOPHAGOGASTRODUODENOSCOPY N/A 10/3/2018    Procedure: EGD (ESOPHAGOGASTRODUODENOSCOPY);  Surgeon: Cameron Shelley MD;  Location: [address]);  Service: Endoscopy;  Laterality: N/A;    HYSTERECTOMY      laser to vaginal      PARTIAL HYMENECTOMY      sweat glands Bilateral     removial      Family History   Problem Relation Age of Onset    Hypertension Father     Hypertension Mother     Diabetes Sister     Cancer Sister     Breast cancer Sister 43        aprox    Breast cancer Maternal Aunt     Ovarian cancer Sister 43        aprox    Colon cancer Neg Hx     Esophageal cancer Neg Hx      Social  History     Tobacco Use    Smoking status: Never Smoker    Smokeless tobacco: Never Used   Substance Use Topics    Alcohol use: Yes     Alcohol/week: 1.0 standard drinks     Types: 1 Glasses of wine per week     Comment: occasional    Drug use: No     Review of Systems   Constitutional: Positive for chills. Negative for fever.   HENT: Positive for congestion, postnasal drip, sinus pain and sore throat.    Eyes: Negative.    Respiratory: Negative.  Negative for shortness of breath.    Cardiovascular: Negative.  Negative for chest pain.   Gastrointestinal: Negative.  Negative for nausea and vomiting.   Endocrine: Negative.    Genitourinary: Negative.  Negative for dysuria.   Musculoskeletal: Negative.  Negative for myalgias.   Skin: Negative.  Negative for rash.   Allergic/Immunologic: Negative.    Neurological: Positive for headaches.   Hematological: Negative.  Negative for adenopathy.   Psychiatric/Behavioral: Negative.  Negative for behavioral problems.   All other systems reviewed and are negative.      Physical Exam     Initial Vitals [01/24/20 2020]   BP Pulse Resp Temp SpO2   (!) 185/92 76 20 98.8 °F (37.1 °C) 99 %      MAP       --         Physical Exam    Nursing note and vitals reviewed.  Constitutional: She appears well-developed and well-nourished.   HENT:   Head: Normocephalic and atraumatic.   Right Ear: Tympanic membrane, external ear and ear canal normal.   Left Ear: Tympanic membrane, external ear and ear canal normal.   Nose: Mucosal edema present.   Mouth/Throat: Uvula is midline, oropharynx is clear and moist and mucous membranes are normal. No trismus in the jaw. No oropharyngeal exudate, posterior oropharyngeal edema or posterior oropharyngeal erythema.   Postnasal drip.   Eyes: Conjunctivae are normal.   Neck: Normal range of motion. Neck supple.   Cardiovascular: Normal rate, regular rhythm, normal heart sounds and intact distal pulses. Exam reveals no gallop and no friction rub.    No  murmur heard.  Pulmonary/Chest: Effort normal and breath sounds normal. No respiratory distress. She has no wheezes. She has no rhonchi. She has no rales.   Abdominal: Soft. There is no tenderness.   Musculoskeletal: Normal range of motion.   Neurological: She is alert and oriented to person, place, and time.   Skin: Skin is warm and dry. Capillary refill takes less than 2 seconds.   Psychiatric: She has a normal mood and affect. Her behavior is normal.         ED Course   Procedures  Labs Reviewed - No data to display       Imaging Results    None          Medical Decision Making:   History:   Old Medical Records: I decided to obtain old medical records.            Scribe Attestation:   Scribe #1: I performed the above scribed service and the documentation accurately describes the services I performed. I attest to the accuracy of the note.    This document was produced by a scribe under my direction and in my presence. I agree with the content of the note and have made any necessary edits.     Paul Oglesby MD    01/25/2020 12:35 AM                      Clinical Impression:     1. Sinus headache                                Paul Oglesby MD  01/25/20 0036

## 2020-01-25 NOTE — ED TRIAGE NOTES
Pt presents to ER with c/o a sinus headache to forehead and face accompanied by sore throat, congestion. Has been taking otc meds but with minimal relief.

## 2020-02-03 ENCOUNTER — LAB VISIT (OUTPATIENT)
Dept: LAB | Facility: HOSPITAL | Age: 60
End: 2020-02-03
Attending: NEUROLOGICAL SURGERY
Payer: MEDICAID

## 2020-02-03 ENCOUNTER — OFFICE VISIT (OUTPATIENT)
Dept: NEUROLOGY | Facility: CLINIC | Age: 60
End: 2020-02-03
Payer: MEDICAID

## 2020-02-03 VITALS
DIASTOLIC BLOOD PRESSURE: 79 MMHG | HEART RATE: 70 BPM | BODY MASS INDEX: 32.96 KG/M2 | SYSTOLIC BLOOD PRESSURE: 171 MMHG | HEIGHT: 67 IN | WEIGHT: 210 LBS

## 2020-02-03 DIAGNOSIS — Z86.69 HISTORY OF SEIZURE DISORDER: Primary | ICD-10-CM

## 2020-02-03 DIAGNOSIS — Z86.69 HISTORY OF SEIZURE DISORDER: ICD-10-CM

## 2020-02-03 DIAGNOSIS — R51.9 NONINTRACTABLE HEADACHE, UNSPECIFIED CHRONICITY PATTERN, UNSPECIFIED HEADACHE TYPE: ICD-10-CM

## 2020-02-03 LAB
ALBUMIN SERPL BCP-MCNC: 4.4 G/DL (ref 3.5–5.2)
ALP SERPL-CCNC: 86 U/L (ref 55–135)
ALT SERPL W/O P-5'-P-CCNC: 25 U/L (ref 10–44)
ANION GAP SERPL CALC-SCNC: 8 MMOL/L (ref 8–16)
AST SERPL-CCNC: 24 U/L (ref 10–40)
BASOPHILS # BLD AUTO: 0.05 K/UL (ref 0–0.2)
BASOPHILS NFR BLD: 0.8 % (ref 0–1.9)
BILIRUB SERPL-MCNC: 0.3 MG/DL (ref 0.1–1)
BUN SERPL-MCNC: 16 MG/DL (ref 6–20)
CALCIUM SERPL-MCNC: 10.1 MG/DL (ref 8.7–10.5)
CARBAMAZEPINE SERPL-MCNC: 8.8 UG/ML (ref 4–12)
CHLORIDE SERPL-SCNC: 107 MMOL/L (ref 95–110)
CO2 SERPL-SCNC: 27 MMOL/L (ref 23–29)
CREAT SERPL-MCNC: 1.1 MG/DL (ref 0.5–1.4)
DIFFERENTIAL METHOD: ABNORMAL
EOSINOPHIL # BLD AUTO: 0.1 K/UL (ref 0–0.5)
EOSINOPHIL NFR BLD: 1.3 % (ref 0–8)
ERYTHROCYTE [DISTWIDTH] IN BLOOD BY AUTOMATED COUNT: 12.5 % (ref 11.5–14.5)
EST. GFR  (AFRICAN AMERICAN): >60 ML/MIN/1.73 M^2
EST. GFR  (NON AFRICAN AMERICAN): 55 ML/MIN/1.73 M^2
GLUCOSE SERPL-MCNC: 95 MG/DL (ref 70–110)
HCT VFR BLD AUTO: 40.3 % (ref 37–48.5)
HGB BLD-MCNC: 12.7 G/DL (ref 12–16)
IMM GRANULOCYTES # BLD AUTO: 0.01 K/UL (ref 0–0.04)
IMM GRANULOCYTES NFR BLD AUTO: 0.2 % (ref 0–0.5)
LYMPHOCYTES # BLD AUTO: 3.5 K/UL (ref 1–4.8)
LYMPHOCYTES NFR BLD: 58 % (ref 18–48)
MCH RBC QN AUTO: 31.3 PG (ref 27–31)
MCHC RBC AUTO-ENTMCNC: 31.5 G/DL (ref 32–36)
MCV RBC AUTO: 99 FL (ref 82–98)
MONOCYTES # BLD AUTO: 0.6 K/UL (ref 0.3–1)
MONOCYTES NFR BLD: 9.8 % (ref 4–15)
NEUTROPHILS # BLD AUTO: 1.8 K/UL (ref 1.8–7.7)
NEUTROPHILS NFR BLD: 29.9 % (ref 38–73)
NRBC BLD-RTO: 0 /100 WBC
PLATELET # BLD AUTO: 205 K/UL (ref 150–350)
PMV BLD AUTO: 9.4 FL (ref 9.2–12.9)
POTASSIUM SERPL-SCNC: 4.2 MMOL/L (ref 3.5–5.1)
PROT SERPL-MCNC: 8.2 G/DL (ref 6–8.4)
RBC # BLD AUTO: 4.06 M/UL (ref 4–5.4)
SODIUM SERPL-SCNC: 142 MMOL/L (ref 136–145)
WBC # BLD AUTO: 6.03 K/UL (ref 3.9–12.7)

## 2020-02-03 PROCEDURE — 80177 DRUG SCRN QUAN LEVETIRACETAM: CPT

## 2020-02-03 PROCEDURE — 80156 ASSAY CARBAMAZEPINE TOTAL: CPT

## 2020-02-03 PROCEDURE — 99213 OFFICE O/P EST LOW 20 MIN: CPT | Mod: PBBFAC | Performed by: NEUROLOGICAL SURGERY

## 2020-02-03 PROCEDURE — 99214 OFFICE O/P EST MOD 30 MIN: CPT | Mod: S$PBB,,, | Performed by: NEUROLOGICAL SURGERY

## 2020-02-03 PROCEDURE — 99999 PR PBB SHADOW E&M-EST. PATIENT-LVL III: ICD-10-PCS | Mod: PBBFAC,,, | Performed by: NEUROLOGICAL SURGERY

## 2020-02-03 PROCEDURE — 80201 ASSAY OF TOPIRAMATE: CPT

## 2020-02-03 PROCEDURE — 85025 COMPLETE CBC W/AUTO DIFF WBC: CPT

## 2020-02-03 PROCEDURE — 80053 COMPREHEN METABOLIC PANEL: CPT

## 2020-02-03 PROCEDURE — 99999 PR PBB SHADOW E&M-EST. PATIENT-LVL III: CPT | Mod: PBBFAC,,, | Performed by: NEUROLOGICAL SURGERY

## 2020-02-03 PROCEDURE — 99214 PR OFFICE/OUTPT VISIT, EST, LEVL IV, 30-39 MIN: ICD-10-PCS | Mod: S$PBB,,, | Performed by: NEUROLOGICAL SURGERY
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
## 2020-02-05 LAB — LEVETIRACETAM SERPL-MCNC: 25.9 UG/ML (ref 3–60)

## 2020-02-06 LAB — TOPIRAMATE SERPL-MCNC: 9.2 MCG/ML

## 2020-03-06 ENCOUNTER — TELEPHONE (OUTPATIENT)
Dept: ADMINISTRATIVE | Facility: OTHER | Age: 60
End: 2020-03-06

## 2020-03-09 ENCOUNTER — OFFICE VISIT (OUTPATIENT)
Dept: GYNECOLOGIC ONCOLOGY | Facility: CLINIC | Age: 60
End: 2020-03-09
Payer: MEDICAID

## 2020-03-09 VITALS
HEIGHT: 67 IN | SYSTOLIC BLOOD PRESSURE: 161 MMHG | WEIGHT: 214.5 LBS | BODY MASS INDEX: 33.67 KG/M2 | DIASTOLIC BLOOD PRESSURE: 77 MMHG | HEART RATE: 72 BPM

## 2020-03-09 DIAGNOSIS — N89.3 VAGINAL DYSPLASIA: Primary | ICD-10-CM

## 2020-03-09 PROCEDURE — 87624 HPV HI-RISK TYP POOLED RSLT: CPT

## 2020-03-09 PROCEDURE — 99213 OFFICE O/P EST LOW 20 MIN: CPT | Mod: PBBFAC | Performed by: OBSTETRICS & GYNECOLOGY

## 2020-03-09 PROCEDURE — 88175 CYTOPATH C/V AUTO FLUID REDO: CPT

## 2020-03-09 PROCEDURE — 99999 PR PBB SHADOW E&M-EST. PATIENT-LVL III: ICD-10-PCS | Mod: PBBFAC,,, | Performed by: OBSTETRICS & GYNECOLOGY

## 2020-03-09 PROCEDURE — 99999 PR PBB SHADOW E&M-EST. PATIENT-LVL III: CPT | Mod: PBBFAC,,, | Performed by: OBSTETRICS & GYNECOLOGY

## 2020-03-09 PROCEDURE — 99214 PR OFFICE/OUTPT VISIT, EST, LEVL IV, 30-39 MIN: ICD-10-PCS | Mod: S$PBB,,, | Performed by: OBSTETRICS & GYNECOLOGY

## 2020-03-09 PROCEDURE — 99214 OFFICE O/P EST MOD 30 MIN: CPT | Mod: S$PBB,,, | Performed by: OBSTETRICS & GYNECOLOGY

## 2020-03-09 RX ORDER — OMEPRAZOLE 40 MG/1
CAPSULE, DELAYED RELEASE ORAL
COMMUNITY
End: 2020-12-23

## 2020-03-09 RX ORDER — ALPRAZOLAM 0.5 MG/1
TABLET ORAL
COMMUNITY
Start: 2020-02-13

## 2020-03-09 RX ORDER — TOPIRAMATE 200 MG/1
TABLET ORAL
COMMUNITY
End: 2020-03-09

## 2020-03-09 RX ORDER — CYANOCOBALAMIN 1000 UG/ML
1 INJECTION, SOLUTION INTRAMUSCULAR; SUBCUTANEOUS
COMMUNITY
End: 2020-03-09

## 2020-03-09 RX ORDER — LEVETIRACETAM 500 MG/1
TABLET ORAL
COMMUNITY
End: 2020-03-09

## 2020-03-09 RX ORDER — LISINOPRIL 10 MG/1
TABLET ORAL
COMMUNITY
End: 2020-12-23

## 2020-03-09 RX ORDER — FAMOTIDINE 20 MG/1
TABLET, FILM COATED ORAL
COMMUNITY
End: 2020-03-09

## 2020-03-09 RX ORDER — LABETALOL 100 MG/1
TABLET, FILM COATED ORAL
COMMUNITY
End: 2020-03-09

## 2020-03-09 RX ORDER — ACETAMINOPHEN 500 MG
TABLET ORAL
COMMUNITY
End: 2020-03-09

## 2020-03-09 RX ORDER — PANTOPRAZOLE SODIUM 40 MG/1
TABLET, DELAYED RELEASE ORAL
COMMUNITY
End: 2020-03-09

## 2020-03-09 RX ORDER — ERGOCALCIFEROL 1.25 MG/1
CAPSULE ORAL
COMMUNITY
Start: 2020-02-13

## 2020-03-09 RX ORDER — PREDNISONE 10 MG/1
TABLET ORAL
COMMUNITY
End: 2020-03-09

## 2020-03-09 RX ORDER — BUTALBITAL, ACETAMINOPHEN AND CAFFEINE 50; 325; 40 MG/1; MG/1; MG/1
TABLET ORAL
COMMUNITY
End: 2020-03-09

## 2020-03-09 RX ORDER — SIMVASTATIN 20 MG/1
TABLET, FILM COATED ORAL
COMMUNITY
End: 2020-03-09

## 2020-03-09 RX ORDER — TOPIRAMATE 50 MG/1
TABLET, FILM COATED ORAL
COMMUNITY
End: 2020-03-09

## 2020-03-09 RX ORDER — DICLOFENAC SODIUM 10 MG/G
GEL TOPICAL
COMMUNITY
End: 2020-03-09

## 2020-03-09 RX ORDER — CHLORHEXIDINE GLUCONATE ORAL RINSE 1.2 MG/ML
15 SOLUTION DENTAL
COMMUNITY
End: 2020-03-09

## 2020-03-09 RX ORDER — FOLIC ACID 1 MG/1
TABLET ORAL
COMMUNITY
Start: 2010-12-10 | End: 2020-03-09

## 2020-03-09 RX ORDER — CARBAMAZEPINE 300 MG/1
CAPSULE, EXTENDED RELEASE ORAL
COMMUNITY
End: 2020-03-09

## 2020-03-09 RX ORDER — LIDOCAINE HYDROCHLORIDE 20 MG/ML
SOLUTION ORAL; TOPICAL
COMMUNITY
Start: 2020-02-13 | End: 2020-03-09

## 2020-03-09 RX ORDER — PHENOL/SODIUM PHENOLATE
AEROSOL, SPRAY (ML) MUCOUS MEMBRANE
COMMUNITY
End: 2020-03-09

## 2020-03-09 RX ORDER — CEFADROXIL 500 MG/1
CAPSULE ORAL
COMMUNITY
End: 2020-03-09

## 2020-03-09 RX ORDER — NEOMYCIN SULFATE, POLYMYXIN B SULFATE AND HYDROCORTISONE 10; 3.5; 1 MG/ML; MG/ML; [USP'U]/ML
SUSPENSION/ DROPS AURICULAR (OTIC)
COMMUNITY
End: 2020-03-09

## 2020-03-09 RX ORDER — NAPROXEN 500 MG/1
TABLET ORAL
COMMUNITY
Start: 2020-02-27 | End: 2020-12-20 | Stop reason: ALTCHOICE

## 2020-03-09 RX ORDER — GABAPENTIN 300 MG/1
CAPSULE ORAL
COMMUNITY
End: 2020-03-09

## 2020-03-09 RX ORDER — FLUTICASONE PROPIONATE 50 MCG
SPRAY, SUSPENSION (ML) NASAL
COMMUNITY
End: 2020-03-09

## 2020-03-09 RX ORDER — METHOCARBAMOL 500 MG/1
TABLET, FILM COATED ORAL
COMMUNITY
End: 2020-03-09

## 2020-03-09 RX ORDER — BUMETANIDE 1 MG/1
TABLET ORAL
COMMUNITY

## 2020-03-09 RX ORDER — LANSOPRAZOLE 30 MG/1
TABLET, ORALLY DISINTEGRATING, DELAYED RELEASE ORAL
COMMUNITY
End: 2020-03-09

## 2020-03-09 RX ORDER — ESTRADIOL 0.1 MG/G
CREAM VAGINAL
COMMUNITY
End: 2020-03-09

## 2020-03-09 RX ORDER — DOXYCYCLINE 100 MG/1
CAPSULE ORAL
COMMUNITY
End: 2020-03-09

## 2020-03-09 RX ORDER — DICYCLOMINE HYDROCHLORIDE 20 MG/1
TABLET ORAL
COMMUNITY
End: 2020-03-09

## 2020-03-09 RX ORDER — ERGOCALCIFEROL 1.25 MG/1
CAPSULE ORAL
COMMUNITY
End: 2020-03-09

## 2020-03-09 RX ORDER — ALPRAZOLAM 0.5 MG/1
TABLET ORAL
COMMUNITY
End: 2020-03-09 | Stop reason: SDUPTHER

## 2020-03-09 RX ORDER — LINACLOTIDE 145 UG/1
CAPSULE, GELATIN COATED ORAL
COMMUNITY
Start: 2020-02-13 | End: 2020-03-09

## 2020-03-09 RX ORDER — OMEPRAZOLE 40 MG/1
CAPSULE, DELAYED RELEASE ORAL
COMMUNITY
Start: 2020-02-20 | End: 2020-03-09

## 2020-03-09 RX ORDER — METHOCARBAMOL 500 MG/1
TABLET, FILM COATED ORAL
COMMUNITY
Start: 2020-02-27 | End: 2020-03-09

## 2020-03-09 NOTE — PROGRESS NOTES
Subjective:      Patient ID: Yanique Thomson is a 59 y.o. female.    Chief Complaint: No chief complaint on file.      HPI  Patient is a 56yp female who originally presented as a referral from Dr. Addis Galeana for moderate vaginal dysplasia. 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      Follow up pap 1/16/17 negative for dysplasia. Repeat pap 7/2017 negative.       An outside provider performed a pap smear in 1/2018 showing LSIL, negative HPV. Exam normal.   Vaginal estrogen.  Follow up exam and vaginal cytology 3/2019 pap negative.      Presents today for follow up. Without complaints or concerns. No vaginal bleeding.       Review of Systems   Constitutional: Negative for appetite change, chills, fatigue and fever.   HENT: Negative for mouth sores.    Respiratory: Negative for cough and shortness of breath.    Cardiovascular: Negative for leg swelling.   Gastrointestinal: Negative for abdominal pain, blood in stool, constipation and diarrhea.   Endocrine: Negative for cold intolerance.   Genitourinary: Negative for dysuria and vaginal bleeding.   Musculoskeletal: Negative for myalgias.   Skin: Negative for rash.   Allergic/Immunologic: Negative.    Neurological: Negative for weakness and numbness.   Hematological: Negative for adenopathy. Does not bruise/bleed easily.   Psychiatric/Behavioral: Negative for confusion.       Objective:   Physical Exam:   Constitutional: She is oriented to person, place, and time. She appears well-developed and well-nourished.    HENT:   Head: Normocephalic and atraumatic.    Eyes: Pupils are equal, round, and reactive to light. EOM are normal.    Neck: Normal range of motion. Neck supple. No thyromegaly present.    Cardiovascular: Normal rate, regular rhythm and intact distal pulses.     Pulmonary/Chest: Effort normal and breath sounds normal. No  respiratory distress. She has no wheezes.        Abdominal: Soft. Bowel sounds are normal. She exhibits no distension, no ascites and no mass. There is no tenderness.     Genitourinary: Rectum normal and vagina normal. Pelvic exam was performed with patient supine. There is no lesion on the right labia. There is no lesion on the left labia. Uterus is absent. Right adnexum displays no mass. Left adnexum displays no mass. Vaginal cuff normal.Cervix exhibits absence.           Musculoskeletal: Normal range of motion and moves all extremeties.      Lymphadenopathy:     She has no cervical adenopathy.        Right: No inguinal and no supraclavicular adenopathy present.        Left: No inguinal and no supraclavicular adenopathy present.    Neurological: She is alert and oriented to person, place, and time.    Skin: Skin is warm and dry. No rash noted.    Psychiatric: She has a normal mood and affect.       Assessment:     1. Vaginal dysplasia        Plan:   No orders of the defined types were placed in this encounter.    Surveillance pap for history of vaginal dysplasia collected today.   Exam is normal with no gross lesions.   Last pap 3/2019 normal.   If vaginal cytology remains normal today will plan to return care to primary ob/gyn provider.

## 2020-03-13 LAB
HPV HR 12 DNA SPEC QL NAA+PROBE: NEGATIVE
HPV16 AG SPEC QL: NEGATIVE
HPV18 DNA SPEC QL NAA+PROBE: NEGATIVE

## 2020-03-31 LAB
FINAL PATHOLOGIC DIAGNOSIS: NORMAL
Lab: NORMAL

## 2020-04-01 ENCOUNTER — TELEPHONE (OUTPATIENT)
Dept: GYNECOLOGIC ONCOLOGY | Facility: CLINIC | Age: 60
End: 2020-04-01

## 2020-04-02 ENCOUNTER — TELEPHONE (OUTPATIENT)
Dept: GYNECOLOGIC ONCOLOGY | Facility: CLINIC | Age: 60
End: 2020-04-02

## 2020-04-02 NOTE — TELEPHONE ENCOUNTER
----- Message from Carlene Zhao MD sent at 4/1/2020  8:17 AM CDT -----  Please let patient know pap and HPV are negative. She can follow up with her primary ob gyn does not need additional follow up with gyn onc. I sent her a message via the portal as well. Thank you.

## 2020-05-14 DIAGNOSIS — Z86.69 HISTORY OF SEIZURE DISORDER: ICD-10-CM

## 2020-05-14 RX ORDER — TOPIRAMATE 200 MG/1
TABLET, COATED ORAL
Qty: 60 TABLET | Refills: 11 | Status: SHIPPED | OUTPATIENT
Start: 2020-05-14 | End: 2021-04-16 | Stop reason: SDUPTHER

## 2020-11-05 ENCOUNTER — HOSPITAL ENCOUNTER (EMERGENCY)
Facility: HOSPITAL | Age: 60
Discharge: HOME | End: 2020-11-05
Attending: EMERGENCY MEDICINE
Payer: MEDICAID

## 2020-11-05 VITALS
SYSTOLIC BLOOD PRESSURE: 154 MMHG | WEIGHT: 202 LBS | DIASTOLIC BLOOD PRESSURE: 83 MMHG | BODY MASS INDEX: 31.71 KG/M2 | OXYGEN SATURATION: 100 % | RESPIRATION RATE: 20 BRPM | HEIGHT: 67 IN | HEART RATE: 75 BPM | TEMPERATURE: 99 F

## 2020-11-05 DIAGNOSIS — L02.31 ABSCESS OF RIGHT BUTTOCK: Primary | ICD-10-CM

## 2020-11-05 PROCEDURE — 25000003 PHARM REV CODE 250: Mod: ER | Performed by: NURSE PRACTITIONER

## 2020-11-05 PROCEDURE — 10060 I&D ABSCESS SIMPLE/SINGLE: CPT | Mod: ER

## 2020-11-05 PROCEDURE — 99284 EMERGENCY DEPT VISIT MOD MDM: CPT | Mod: 25,ER

## 2020-11-05 RX ORDER — SULFAMETHOXAZOLE AND TRIMETHOPRIM 800; 160 MG/1; MG/1
1 TABLET ORAL 2 TIMES DAILY
Qty: 14 TABLET | Refills: 0 | Status: SHIPPED | OUTPATIENT
Start: 2020-11-05 | End: 2020-11-12

## 2020-11-05 RX ORDER — MUPIROCIN 20 MG/G
OINTMENT TOPICAL 3 TIMES DAILY
Qty: 15 G | Refills: 0 | Status: SHIPPED | OUTPATIENT
Start: 2020-11-05

## 2020-11-05 RX ORDER — TRAMADOL HYDROCHLORIDE 50 MG/1
50 TABLET ORAL
Status: COMPLETED | OUTPATIENT
Start: 2020-11-05 | End: 2020-11-05

## 2020-11-05 RX ORDER — SULFAMETHOXAZOLE AND TRIMETHOPRIM 800; 160 MG/1; MG/1
1 TABLET ORAL
Status: COMPLETED | OUTPATIENT
Start: 2020-11-05 | End: 2020-11-05

## 2020-11-05 RX ORDER — LIDOCAINE HYDROCHLORIDE AND EPINEPHRINE 10; 10 MG/ML; UG/ML
10 INJECTION, SOLUTION INFILTRATION; PERINEURAL
Status: COMPLETED | OUTPATIENT
Start: 2020-11-05 | End: 2020-11-05

## 2020-11-05 RX ORDER — TRAMADOL HYDROCHLORIDE 50 MG/1
50 TABLET ORAL EVERY 6 HOURS PRN
Qty: 8 TABLET | Refills: 0 | Status: SHIPPED | OUTPATIENT
Start: 2020-11-05 | End: 2020-12-23

## 2020-11-05 RX ADMIN — TRAMADOL HYDROCHLORIDE 50 MG: 50 TABLET, FILM COATED ORAL at 03:11

## 2020-11-05 RX ADMIN — SULFAMETHOXAZOLE AND TRIMETHOPRIM 1 TABLET: 800; 160 TABLET ORAL at 03:11

## 2020-11-05 RX ADMIN — LIDOCAINE HYDROCHLORIDE AND EPINEPHRINE 10 ML: 10; 10 INJECTION, SOLUTION INFILTRATION; PERINEURAL at 03:11

## 2020-11-05 NOTE — DISCHARGE INSTRUCTIONS
You have been prescribed TRAMADOL for pain. Please do not take this medication while working, drinking alcohol, swimming, or while driving/operating heavy machinery. This medication may cause drowsiness, impair judgment, and reduce physical capabilities.    Please make sure to maintain good hygiene, if the abscess is in an area you shave then change your razor and do not shave the area until the wound is healed, do not share towels or other personal items, and use warm compresses 10-15 minutes, 4-5 times a day to help increase wound drainage and decrease swelling, and take your antibiotic medication as prescribed.     Please return to the Emergency Department for any new or worsening problems including: worsening of your abscess, increasing redness or redness extending further up your body, or temperature of greater than 100.4F.    Please follow up with your Primary Care Doctor or Return to the ED in 2-3 days for packing removal and a wound check, or sooner if you wound gets worse. Your packing needs to be removed in 2 - 3 days.

## 2020-12-20 ENCOUNTER — HOSPITAL ENCOUNTER (EMERGENCY)
Facility: HOSPITAL | Age: 60
Discharge: HOME | End: 2020-12-20
Attending: EMERGENCY MEDICINE
Payer: MEDICAID

## 2020-12-20 VITALS
OXYGEN SATURATION: 97 % | HEIGHT: 67 IN | TEMPERATURE: 99 F | HEART RATE: 88 BPM | RESPIRATION RATE: 18 BRPM | DIASTOLIC BLOOD PRESSURE: 97 MMHG | WEIGHT: 202 LBS | SYSTOLIC BLOOD PRESSURE: 170 MMHG | BODY MASS INDEX: 31.71 KG/M2

## 2020-12-20 DIAGNOSIS — L73.2 HIDRADENITIS: Primary | ICD-10-CM

## 2020-12-20 PROCEDURE — 25000003 PHARM REV CODE 250: Mod: ER | Performed by: EMERGENCY MEDICINE

## 2020-12-20 PROCEDURE — 10060 I&D ABSCESS SIMPLE/SINGLE: CPT | Mod: ER

## 2020-12-20 PROCEDURE — 99284 EMERGENCY DEPT VISIT MOD MDM: CPT | Mod: 25,ER

## 2020-12-20 RX ORDER — DOXYCYCLINE 100 MG/1
100 CAPSULE ORAL 2 TIMES DAILY
Qty: 20 CAPSULE | Refills: 0 | Status: SHIPPED | OUTPATIENT
Start: 2020-12-20 | End: 2020-12-20 | Stop reason: SDUPTHER

## 2020-12-20 RX ORDER — FAMOTIDINE 20 MG/1
20 TABLET, FILM COATED ORAL 2 TIMES DAILY
Qty: 60 TABLET | Refills: 0 | Status: SHIPPED | OUTPATIENT
Start: 2020-12-20 | End: 2020-12-20 | Stop reason: SDUPTHER

## 2020-12-20 RX ORDER — MELOXICAM 15 MG/1
15 TABLET ORAL DAILY
Qty: 30 TABLET | Refills: 0 | Status: SHIPPED | OUTPATIENT
Start: 2020-12-20 | End: 2020-12-20 | Stop reason: SDUPTHER

## 2020-12-20 RX ORDER — MELOXICAM 15 MG/1
15 TABLET ORAL DAILY
Qty: 30 TABLET | Refills: 0 | Status: SHIPPED | OUTPATIENT
Start: 2020-12-20 | End: 2021-02-19 | Stop reason: SDUPTHER

## 2020-12-20 RX ORDER — DOXYCYCLINE HYCLATE 100 MG
100 TABLET ORAL
Status: COMPLETED | OUTPATIENT
Start: 2020-12-20 | End: 2020-12-20

## 2020-12-20 RX ORDER — OXYCODONE AND ACETAMINOPHEN 5; 325 MG/1; MG/1
1 TABLET ORAL
Status: COMPLETED | OUTPATIENT
Start: 2020-12-20 | End: 2020-12-20

## 2020-12-20 RX ORDER — DOXYCYCLINE 100 MG/1
100 CAPSULE ORAL 2 TIMES DAILY
Qty: 20 CAPSULE | Refills: 0 | Status: SHIPPED | OUTPATIENT
Start: 2020-12-20 | End: 2020-12-30

## 2020-12-20 RX ORDER — FAMOTIDINE 20 MG/1
20 TABLET, FILM COATED ORAL 2 TIMES DAILY
Qty: 60 TABLET | Refills: 0 | Status: SHIPPED | OUTPATIENT
Start: 2020-12-20 | End: 2021-12-20

## 2020-12-20 RX ADMIN — DOXYCYCLINE HYCLATE 100 MG: 100 TABLET, COATED ORAL at 09:12

## 2020-12-20 RX ADMIN — OXYCODONE HYDROCHLORIDE AND ACETAMINOPHEN 1 TABLET: 5; 325 TABLET ORAL at 09:12

## 2020-12-21 NOTE — ED PROVIDER NOTES
Encounter Date: 12/20/2020       History     Chief Complaint   Patient presents with    Abscess     right under arm x 4 days     60 y.o. female Past Medical History:  No date: Abscess  No date: EP (epilepsy)  No date: GERD (gastroesophageal reflux disease)  No date: Hyperchloremia  No date: Hypertension  7/5/2016: Moderate vaginal dysplasia  No date: Seizures    Polyadenitis supperativa states she had revision at [facility] when she was approximately 19 years old presents for swelling and pain under right armpit for the last 4 days states she thinks that she may have an abscess in her right arm she denies fevers chills nausea vomiting diarrhea        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    Seizures      Past Surgical History:   Procedure Laterality Date    APPENDECTOMY      CHOLECYSTECTOMY      COLONOSCOPY N/A 10/3/2018    Procedure: COLONOSCOPY;  Surgeon: Cameron Shelley MD;  [facility]  Service: Endoscopy;  Laterality: N/A;    ESOPHAGOGASTRODUODENOSCOPY N/A 10/3/2018    Procedure: EGD (ESOPHAGOGASTRODUODENOSCOPY);  Surgeon: Cameron Shelley MD;  [facility]  Service: Endoscopy;  Laterality: N/A;    HYSTERECTOMY      laser to vaginal      PARTIAL HYMENECTOMY      sweat glands Bilateral     removial      Family History   Problem Relation Age of Onset    Hypertension Father     Hypertension Mother     Diabetes Sister     Cancer Sister     Breast cancer Sister 43        aprox    Breast cancer Maternal Aunt     Ovarian cancer Sister 43        aprox    Prostate cancer Brother     Colon cancer Neg Hx     Esophageal cancer Neg Hx      Social History     Tobacco Use    Smoking status: Never Smoker    Smokeless tobacco: Never Used   Substance Use  Topics    Alcohol use: Yes     Alcohol/week: 1.0 standard drinks     Types: 1 Glasses of wine per week     Comment: occasional    Drug use: No     Review of Systems   Constitutional: Negative for fever.   HENT: Negative for sore throat.    Respiratory: Negative for shortness of breath.    Cardiovascular: Negative for chest pain.   Gastrointestinal: Negative for nausea.   Genitourinary: Negative for dysuria.   Musculoskeletal: Negative for back pain.   Skin: Negative for rash.   Neurological: Negative for weakness.   Hematological: Does not bruise/bleed easily.   All other systems reviewed and are negative.      Physical Exam     Initial Vitals [12/20/20 2031]   BP Pulse Resp Temp SpO2   (!) 191/98 98 19 98.8 °F (37.1 °C) 97 %      MAP       --         Physical Exam    Nursing note and vitals reviewed.  Constitutional: She appears well-developed and well-nourished.   HENT:   Head: Normocephalic and atraumatic.   Eyes: Conjunctivae and EOM are normal. Pupils are equal, round, and reactive to light.   Neck: Normal range of motion.   Cardiovascular: Normal rate and regular rhythm.   Pulmonary/Chest: Breath sounds normal. No respiratory distress.   Abdominal: She exhibits no distension.   Musculoskeletal: Normal range of motion.   Neurological: She is alert. No cranial nerve deficit. GCS score is 15. GCS eye subscore is 4. GCS verbal subscore is 5. GCS motor subscore is 6.   Skin: Skin is warm and dry.   Psychiatric: She has a normal mood and affect. Thought content normal.       R Axilla patient has changes consistent with hidradenitis suppurativa in R axilla  ED Course   I & D - Incision and Drainage    Date/Time: 12/20/2020 9:09 PM  Location procedure was performed: [facility] EMERGENCY DEPARTMENT  Performed by: Turner Marina MD  Authorized by: Turner Marina MD   Pre-operative diagnosis: hidradenitis  Consent Done: Emergent Situation  Type: abscess  Description of findings: mutiple tracks leading to  central portion   Scalpel size: 18g needle decompression.  Complexity: simple  Drainage: pus  Drainage amount: 2ml.  Patient tolerance: Patient tolerated the procedure well with no immediate complications        Labs Reviewed - No data to display       Imaging Results    None                            right axilla she has multiple tracks which are inflamed which drained purulence to a central area I have needle decompress that central area as best I could.  I have discussed with patient that an I and D would not solve her problem given that she has multiple tracks present.  I will discharge her on doxycycline and refer her to surgery for evaluation            Clinical Impression:     ICD-10-CM ICD-9-CM   1. Hidradenitis  L73.2 705.83                          ED Disposition Condition    Discharge Stable        ED Prescriptions     Medication Sig Dispense Start Date End Date Auth. Provider    doxycycline (VIBRAMYCIN) 100 MG Cap Take 1 capsule (100 mg total) by mouth 2 (two) times daily. for 10 days 20 capsule 12/20/2020 12/30/2020 Turner Marina MD    meloxicam (MOBIC) 15 MG tablet Take 1 tablet (15 mg total) by mouth once daily. 30 tablet 12/20/2020  Turner Marina MD    famotidine (PEPCID) 20 MG tablet Take 1 tablet (20 mg total) by mouth 2 (two) times daily. 60 tablet 12/20/2020 12/20/2021 Turner Marina MD        Follow-up Information     Follow up With Specialties Details Why Contact Info Additional Information    [facility]   [address] [place] [address] [phone] [address]                                       Turner Marina MD  12/20/20 2112

## 2020-12-21 NOTE — DISCHARGE INSTRUCTIONS
Thank you for coming to our Emergency Department today. It is important to remember that some problems are difficult to diagnose and may not be found during your first visit. Be sure to follow up with your primary care doctor and review any labs/imaging that was performed with them. If you do not have a primary care doctor, you may contact the one listed on your discharge paperwork or you may also call the [facility] Appointment Desk at [phone] to schedule an appointment with one.     All medications may potentially have side effects and it is impossible to predict which medications may give you side effects. If you feel that you are having a negative effect of any medication you should immediately stop taking them and seek medical attention.    Return to the ER with any questions/concerns, new/concerning symptoms, worsening or failure to improve. Do not drive or make any important decisions for 24 hours if you have received any pain medications, sedatives or mood altering drugs during your ER visit.

## 2020-12-23 ENCOUNTER — OFFICE VISIT (OUTPATIENT)
Dept: SURGERY | Facility: CLINIC | Age: 60
End: 2020-12-23
Payer: MEDICAID

## 2020-12-23 ENCOUNTER — TELEPHONE (OUTPATIENT)
Dept: SURGERY | Facility: CLINIC | Age: 60
End: 2020-12-23

## 2020-12-23 VITALS
HEART RATE: 79 BPM | WEIGHT: 202 LBS | BODY MASS INDEX: 31.71 KG/M2 | SYSTOLIC BLOOD PRESSURE: 153 MMHG | DIASTOLIC BLOOD PRESSURE: 86 MMHG | HEIGHT: 67 IN

## 2020-12-23 DIAGNOSIS — L73.2 HIDRADENITIS: ICD-10-CM

## 2020-12-23 PROCEDURE — 99999 PR PBB SHADOW E&M-EST. PATIENT-LVL IV: ICD-10-PCS | Mod: PBBFAC,,, | Performed by: SURGERY

## 2020-12-23 PROCEDURE — 99999 PR PBB SHADOW E&M-EST. PATIENT-LVL IV: CPT | Mod: PBBFAC,,, | Performed by: SURGERY

## 2020-12-23 PROCEDURE — 99202 PR OFFICE/OUTPT VISIT, NEW, LEVL II, 15-29 MIN: ICD-10-PCS | Mod: S$PBB,,, | Performed by: SURGERY

## 2020-12-23 PROCEDURE — 99202 OFFICE O/P NEW SF 15 MIN: CPT | Mod: S$PBB,,, | Performed by: SURGERY

## 2020-12-23 PROCEDURE — 99214 OFFICE O/P EST MOD 30 MIN: CPT | Mod: PBBFAC | Performed by: SURGERY

## 2020-12-23 RX ORDER — DOXYCYCLINE 100 MG/1
100 CAPSULE ORAL 2 TIMES DAILY
Qty: 40 CAPSULE | Refills: 0 | Status: SHIPPED | OUTPATIENT
Start: 2020-12-23

## 2020-12-23 RX ORDER — AMLODIPINE BESYLATE 5 MG/1
TABLET ORAL
COMMUNITY

## 2020-12-23 NOTE — PROGRESS NOTES
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  Seen in ED and was treated with doxy and now has open draining wound.    PE: 1 x 3 cm open axillary wound with mild drainage.  Some surrounding indurated tissue. No erythema or fluctuanec    Impression: recrrent hidradenitits,    Plan: will continue current regimen as she is improving clinically, and after thsi acute episode we will further discuss surgery and schedule such

## 2020-12-23 NOTE — LETTER
December 23, 2020      Turner Marina MD  [address]  [facility] [address]           [place] [facility] [address] [facility] [place] [address]  Phone: [phone]          Patient: Yanique Thomson   MR Number: [id number]   YOB: 1960   Date of Visit: 12/23/2020       Dear Dr. Turner Marina:    Thank you for referring Yanique Thomson to me for evaluation. Attached you will find relevant portions of my assessment and plan of care.    If you have questions, please do not hesitate to call me. I look forward to following Yanique Thomson along with you.    Sincerely,    Gigi Kaye MD    Enclosure  CC:  No Recipients    If you would like to receive this communication electronically, please contact [email or web address] or [phone] to request more information on [facility] Link access.    For providers and/or their staff who would like to refer a patient to [facility], please contact us through our one-stop-shop provider referral line, [facility], at [phone].    If you feel you have received this communication in error or would no longer like to receive these types of communications, please e-mail [email or web address]

## 2020-12-23 NOTE — TELEPHONE ENCOUNTER
Pt notified of appt with  today at 1:30pm      ----- Message from Eloisa Nolasco sent at 12/23/2020 10:33 AM CST -----  Regarding: Call Back  Contact: Patient  Type: Patient Call Back    Who called: Patient    What is the request in detail: Patient went tot the ER this past weekend; was told to reach out if she had not heard back from someone by Weds. (12/23). Please Advise    Can the clinic reply by [facility]? No    Would the patient rather a call back or a response via [facility]? Call    Best call back number: [phone]    Additional Information: Patient is Medicaid    Thanks

## 2021-01-20 ENCOUNTER — OFFICE VISIT (OUTPATIENT)
Dept: SURGERY | Facility: CLINIC | Age: 61
End: 2021-01-20
Payer: MEDICAID

## 2021-01-20 VITALS
DIASTOLIC BLOOD PRESSURE: 115 MMHG | HEART RATE: 72 BPM | HEIGHT: 67 IN | BODY MASS INDEX: 31.71 KG/M2 | SYSTOLIC BLOOD PRESSURE: 171 MMHG | WEIGHT: 202 LBS

## 2021-01-20 DIAGNOSIS — L73.2 HIDRADENITIS: Primary | ICD-10-CM

## 2021-01-20 PROCEDURE — 99999 PR PBB SHADOW E&M-EST. PATIENT-LVL III: CPT | Mod: PBBFAC,,, | Performed by: SURGERY

## 2021-01-20 PROCEDURE — 99212 OFFICE O/P EST SF 10 MIN: CPT | Mod: S$PBB,,, | Performed by: SURGERY

## 2021-01-20 PROCEDURE — 99999 PR PBB SHADOW E&M-EST. PATIENT-LVL III: ICD-10-PCS | Mod: PBBFAC,,, | Performed by: SURGERY

## 2021-01-20 PROCEDURE — 99213 OFFICE O/P EST LOW 20 MIN: CPT | Mod: PBBFAC | Performed by: SURGERY

## 2021-01-20 PROCEDURE — 99212 PR OFFICE/OUTPT VISIT, EST, LEVL II, 10-19 MIN: ICD-10-PCS | Mod: S$PBB,,, | Performed by: SURGERY

## 2021-02-19 ENCOUNTER — HOSPITAL ENCOUNTER (EMERGENCY)
Facility: HOSPITAL | Age: 61
Discharge: HOME | End: 2021-02-19
Attending: EMERGENCY MEDICINE
Payer: MEDICAID

## 2021-02-19 VITALS
HEART RATE: 74 BPM | SYSTOLIC BLOOD PRESSURE: 185 MMHG | WEIGHT: 209 LBS | BODY MASS INDEX: 32.73 KG/M2 | OXYGEN SATURATION: 100 % | DIASTOLIC BLOOD PRESSURE: 79 MMHG | RESPIRATION RATE: 20 BRPM | TEMPERATURE: 98 F

## 2021-02-19 DIAGNOSIS — I10 HYPERTENSION, UNSPECIFIED TYPE: Primary | ICD-10-CM

## 2021-02-19 DIAGNOSIS — M25.561 RIGHT KNEE PAIN: ICD-10-CM

## 2021-02-19 DIAGNOSIS — M17.11 ARTHRITIS OF RIGHT KNEE: ICD-10-CM

## 2021-02-19 PROCEDURE — 99283 EMERGENCY DEPT VISIT LOW MDM: CPT | Mod: 25,ER

## 2021-02-19 PROCEDURE — 25000003 PHARM REV CODE 250: Mod: ER | Performed by: EMERGENCY MEDICINE

## 2021-02-19 RX ORDER — HYDROCODONE BITARTRATE AND ACETAMINOPHEN 5; 325 MG/1; MG/1
1 TABLET ORAL
Status: COMPLETED | OUTPATIENT
Start: 2021-02-19 | End: 2021-02-19

## 2021-02-19 RX ORDER — MELOXICAM 15 MG/1
15 TABLET ORAL DAILY
Qty: 30 TABLET | Refills: 0 | Status: SHIPPED | OUTPATIENT
Start: 2021-02-19

## 2021-02-19 RX ORDER — METOPROLOL TARTRATE 25 MG/1
25 TABLET, FILM COATED ORAL
Status: COMPLETED | OUTPATIENT
Start: 2021-02-19 | End: 2021-02-19

## 2021-02-19 RX ADMIN — METOPROLOL TARTRATE 25 MG: 25 TABLET, FILM COATED ORAL at 06:02

## 2021-02-19 RX ADMIN — HYDROCODONE BITARTRATE AND ACETAMINOPHEN 1 TABLET: 5; 325 TABLET ORAL at 06:02

## 2021-03-02 ENCOUNTER — TELEPHONE (OUTPATIENT)
Dept: NEUROLOGY | Facility: CLINIC | Age: 61
End: 2021-03-02

## 2021-03-11 ENCOUNTER — TELEPHONE (OUTPATIENT)
Dept: SURGERY | Facility: CLINIC | Age: 61
End: 2021-03-11

## 2021-03-15 ENCOUNTER — OFFICE VISIT (OUTPATIENT)
Dept: SURGERY | Facility: CLINIC | Age: 61
End: 2021-03-15
Payer: MEDICAID

## 2021-03-15 VITALS
DIASTOLIC BLOOD PRESSURE: 98 MMHG | HEART RATE: 75 BPM | HEIGHT: 67 IN | SYSTOLIC BLOOD PRESSURE: 172 MMHG | WEIGHT: 209 LBS | BODY MASS INDEX: 32.8 KG/M2

## 2021-03-15 DIAGNOSIS — N61.1 BREAST ABSCESS: Primary | ICD-10-CM

## 2021-03-15 PROCEDURE — 99214 OFFICE O/P EST MOD 30 MIN: CPT | Mod: PBBFAC | Performed by: SURGERY

## 2021-03-15 PROCEDURE — 99999 PR PBB SHADOW E&M-EST. PATIENT-LVL IV: ICD-10-PCS | Mod: PBBFAC,,, | Performed by: SURGERY

## 2021-03-15 PROCEDURE — 99212 PR OFFICE/OUTPT VISIT, EST, LEVL II, 10-19 MIN: ICD-10-PCS | Mod: S$PBB,,, | Performed by: SURGERY

## 2021-03-15 PROCEDURE — 99212 OFFICE O/P EST SF 10 MIN: CPT | Mod: S$PBB,,, | Performed by: SURGERY

## 2021-03-15 PROCEDURE — 99999 PR PBB SHADOW E&M-EST. PATIENT-LVL IV: CPT | Mod: PBBFAC,,, | Performed by: SURGERY

## 2021-03-22 ENCOUNTER — HOSPITAL ENCOUNTER (OUTPATIENT)
Dept: RADIOLOGY | Facility: HOSPITAL | Age: 61
Discharge: HOME | End: 2021-03-22
Attending: SURGERY
Payer: MEDICAID

## 2021-03-22 DIAGNOSIS — N61.1 BREAST ABSCESS: ICD-10-CM

## 2021-03-22 PROCEDURE — 77062 BREAST TOMOSYNTHESIS BI: CPT | Mod: 26,,, | Performed by: RADIOLOGY

## 2021-03-22 PROCEDURE — 77062 BREAST TOMOSYNTHESIS BI: CPT | Mod: TC

## 2021-03-22 PROCEDURE — 77066 MAMMO DIGITAL DIAGNOSTIC BILAT WITH TOMO: ICD-10-PCS | Mod: 26,,, | Performed by: RADIOLOGY

## 2021-03-22 PROCEDURE — 76642 ULTRASOUND BREAST LIMITED: CPT | Mod: TC,LT

## 2021-03-22 PROCEDURE — 77066 DX MAMMO INCL CAD BI: CPT | Mod: 26,,, | Performed by: RADIOLOGY

## 2021-03-22 PROCEDURE — 76642 US BREAST LEFT LIMITED: ICD-10-PCS | Mod: 26,LT,, | Performed by: RADIOLOGY

## 2021-03-22 PROCEDURE — 76642 ULTRASOUND BREAST LIMITED: CPT | Mod: 26,LT,, | Performed by: RADIOLOGY

## 2021-03-22 PROCEDURE — 77062 MAMMO DIGITAL DIAGNOSTIC BILAT WITH TOMO: ICD-10-PCS | Mod: 26,,, | Performed by: RADIOLOGY

## 2021-03-29 ENCOUNTER — OFFICE VISIT (OUTPATIENT)
Dept: SURGERY | Facility: CLINIC | Age: 61
End: 2021-03-29
Payer: MEDICAID

## 2021-03-29 VITALS
OXYGEN SATURATION: 98 % | HEIGHT: 67 IN | HEART RATE: 75 BPM | SYSTOLIC BLOOD PRESSURE: 154 MMHG | WEIGHT: 217.5 LBS | BODY MASS INDEX: 34.14 KG/M2 | DIASTOLIC BLOOD PRESSURE: 86 MMHG | TEMPERATURE: 98 F

## 2021-03-29 DIAGNOSIS — N61.1 BREAST ABSCESS: Primary | ICD-10-CM

## 2021-03-29 PROCEDURE — 99999 PR PBB SHADOW E&M-EST. PATIENT-LVL IV: CPT | Mod: PBBFAC,,, | Performed by: SURGERY

## 2021-03-29 PROCEDURE — 99212 OFFICE O/P EST SF 10 MIN: CPT | Mod: S$PBB,,, | Performed by: SURGERY

## 2021-03-29 PROCEDURE — 99214 OFFICE O/P EST MOD 30 MIN: CPT | Mod: PBBFAC | Performed by: SURGERY

## 2021-03-29 PROCEDURE — 99212 PR OFFICE/OUTPT VISIT, EST, LEVL II, 10-19 MIN: ICD-10-PCS | Mod: S$PBB,,, | Performed by: SURGERY

## 2021-03-29 PROCEDURE — 99999 PR PBB SHADOW E&M-EST. PATIENT-LVL IV: ICD-10-PCS | Mod: PBBFAC,,, | Performed by: SURGERY

## 2021-04-15 ENCOUNTER — PATIENT MESSAGE (OUTPATIENT)
Dept: RESEARCH | Facility: HOSPITAL | Age: 61
End: 2021-04-15

## 2021-04-16 ENCOUNTER — OFFICE VISIT (OUTPATIENT)
Dept: NEUROLOGY | Facility: CLINIC | Age: 61
End: 2021-04-16
Payer: MEDICAID

## 2021-04-16 VITALS
DIASTOLIC BLOOD PRESSURE: 86 MMHG | WEIGHT: 211.63 LBS | SYSTOLIC BLOOD PRESSURE: 171 MMHG | BODY MASS INDEX: 33.21 KG/M2 | HEIGHT: 67 IN | HEART RATE: 72 BPM

## 2021-04-16 DIAGNOSIS — Z86.69 HISTORY OF SEIZURE DISORDER: ICD-10-CM

## 2021-04-16 DIAGNOSIS — G43.009 MIGRAINE WITHOUT AURA AND WITHOUT STATUS MIGRAINOSUS, NOT INTRACTABLE: Primary | ICD-10-CM

## 2021-04-16 PROCEDURE — 99213 OFFICE O/P EST LOW 20 MIN: CPT | Mod: PBBFAC | Performed by: NEUROLOGICAL SURGERY

## 2021-04-16 PROCEDURE — 99999 PR PBB SHADOW E&M-EST. PATIENT-LVL III: ICD-10-PCS | Mod: PBBFAC,,, | Performed by: NEUROLOGICAL SURGERY

## 2021-04-16 PROCEDURE — 99214 PR OFFICE/OUTPT VISIT, EST, LEVL IV, 30-39 MIN: ICD-10-PCS | Mod: S$PBB,,, | Performed by: NEUROLOGICAL SURGERY

## 2021-04-16 PROCEDURE — 99214 OFFICE O/P EST MOD 30 MIN: CPT | Mod: S$PBB,,, | Performed by: NEUROLOGICAL SURGERY

## 2021-04-16 PROCEDURE — 99999 PR PBB SHADOW E&M-EST. PATIENT-LVL III: CPT | Mod: PBBFAC,,, | Performed by: NEUROLOGICAL SURGERY

## 2021-04-16 RX ORDER — LEVETIRACETAM 500 MG/1
1000 TABLET, FILM COATED ORAL 2 TIMES DAILY
Qty: 360 TABLET | Refills: 3 | Status: SHIPPED | OUTPATIENT
Start: 2021-04-16 | End: 2022-04-16

## 2021-04-16 RX ORDER — RIMEGEPANT SULFATE 75 MG/75MG
75 TABLET, ORALLY DISINTEGRATING ORAL ONCE AS NEEDED
Qty: 8 TABLET | Refills: 2 | Status: SHIPPED | OUTPATIENT
Start: 2021-04-16 | End: 2021-04-16

## 2021-04-16 RX ORDER — TOPIRAMATE 50 MG/1
50 TABLET, FILM COATED ORAL 2 TIMES DAILY
Qty: 180 TABLET | Refills: 3 | Status: SHIPPED | OUTPATIENT
Start: 2021-04-16 | End: 2021-04-16 | Stop reason: SDUPTHER

## 2021-04-16 RX ORDER — TOPIRAMATE 200 MG/1
200 TABLET ORAL 2 TIMES DAILY
Qty: 180 TABLET | Refills: 3 | Status: SHIPPED | OUTPATIENT
Start: 2021-04-16 | End: 2021-04-16 | Stop reason: SDUPTHER

## 2021-04-16 RX ORDER — CARBAMAZEPINE 300 MG/1
300 CAPSULE, EXTENDED RELEASE ORAL 3 TIMES DAILY
Qty: 270 CAPSULE | Refills: 3 | Status: SHIPPED | OUTPATIENT
Start: 2021-04-16 | End: 2022-04-16

## 2021-04-16 RX ORDER — TOPIRAMATE 50 MG/1
50 TABLET, FILM COATED ORAL 2 TIMES DAILY
Qty: 180 TABLET | Refills: 3 | Status: SHIPPED | OUTPATIENT
Start: 2021-04-16 | End: 2022-04-16

## 2021-04-16 RX ORDER — ERENUMAB-AOOE 70 MG/ML
70 INJECTION SUBCUTANEOUS
Qty: 1 ML | Refills: 5 | Status: SHIPPED | OUTPATIENT
Start: 2021-04-16 | End: 2021-04-19 | Stop reason: SDUPTHER

## 2021-04-16 RX ORDER — TOPIRAMATE 200 MG/1
200 TABLET, COATED ORAL 2 TIMES DAILY
Qty: 180 TABLET | Refills: 3 | Status: SHIPPED | OUTPATIENT
Start: 2021-04-16 | End: 2022-04-16

## 2021-04-19 DIAGNOSIS — G43.009 MIGRAINE WITHOUT AURA AND WITHOUT STATUS MIGRAINOSUS, NOT INTRACTABLE: ICD-10-CM

## 2021-04-19 RX ORDER — ERENUMAB-AOOE 70 MG/ML
70 INJECTION SUBCUTANEOUS
Qty: 1 ML | Refills: 5 | Status: SHIPPED | OUTPATIENT
Start: 2021-04-19 | End: 2021-10-16

## 2021-04-23 ENCOUNTER — LAB VISIT (OUTPATIENT)
Dept: LAB | Facility: HOSPITAL | Age: 61
End: 2021-04-23
Attending: NEUROLOGICAL SURGERY
Payer: MEDICAID

## 2021-04-23 DIAGNOSIS — G43.009 MIGRAINE WITHOUT AURA AND WITHOUT STATUS MIGRAINOSUS, NOT INTRACTABLE: ICD-10-CM

## 2021-04-23 LAB
CREAT SERPL-MCNC: 1 MG/DL (ref 0.5–1.4)
EST. GFR  (AFRICAN AMERICAN): >60 ML/MIN/1.73 M^2
EST. GFR  (NON AFRICAN AMERICAN): >60 ML/MIN/1.73 M^2

## 2021-04-23 PROCEDURE — 82565 ASSAY OF CREATININE: CPT | Performed by: NEUROLOGICAL SURGERY

## 2021-04-23 PROCEDURE — 36415 COLL VENOUS BLD VENIPUNCTURE: CPT | Performed by: NEUROLOGICAL SURGERY

## 2021-04-25 ENCOUNTER — HOSPITAL ENCOUNTER (EMERGENCY)
Facility: HOSPITAL | Age: 61
Discharge: HOME | End: 2021-04-25
Attending: EMERGENCY MEDICINE
Payer: MEDICAID

## 2021-04-25 VITALS
BODY MASS INDEX: 33.12 KG/M2 | TEMPERATURE: 99 F | OXYGEN SATURATION: 98 % | HEIGHT: 67 IN | DIASTOLIC BLOOD PRESSURE: 97 MMHG | RESPIRATION RATE: 16 BRPM | HEART RATE: 69 BPM | WEIGHT: 211 LBS | SYSTOLIC BLOOD PRESSURE: 170 MMHG

## 2021-04-25 DIAGNOSIS — B96.89 BACTERIAL VAGINOSIS: ICD-10-CM

## 2021-04-25 DIAGNOSIS — R30.0 DYSURIA: Primary | ICD-10-CM

## 2021-04-25 DIAGNOSIS — N76.0 BACTERIAL VAGINOSIS: ICD-10-CM

## 2021-04-25 LAB
BILIRUBIN, POC UA: NEGATIVE
BLOOD, POC UA: NEGATIVE
CLARITY, POC UA: CLEAR
COLOR, POC UA: ABNORMAL
GLUCOSE, POC UA: NEGATIVE
KETONES, POC UA: NEGATIVE
LEUKOCYTE EST, POC UA: ABNORMAL
NITRITE, POC UA: NEGATIVE
PH UR STRIP: 7 [PH]
PROTEIN, POC UA: ABNORMAL
SPECIFIC GRAVITY, POC UA: 1.02
UROBILINOGEN, POC UA: 0.2 E.U./DL

## 2021-04-25 PROCEDURE — 81003 URINALYSIS AUTO W/O SCOPE: CPT | Mod: ER

## 2021-04-25 PROCEDURE — 87481 CANDIDA DNA AMP PROBE: CPT | Mod: 59 | Performed by: EMERGENCY MEDICINE

## 2021-04-25 PROCEDURE — 99284 EMERGENCY DEPT VISIT MOD MDM: CPT | Mod: ER

## 2021-04-25 RX ORDER — PHENAZOPYRIDINE HYDROCHLORIDE 200 MG/1
200 TABLET, FILM COATED ORAL 3 TIMES DAILY PRN
Qty: 6 TABLET | Refills: 0 | Status: SHIPPED | OUTPATIENT
Start: 2021-04-25 | End: 2021-04-28

## 2021-04-25 RX ORDER — ONDANSETRON 4 MG/1
4 TABLET, FILM COATED ORAL EVERY 6 HOURS PRN
Qty: 12 TABLET | Refills: 0 | Status: SHIPPED | OUTPATIENT
Start: 2021-04-25

## 2021-04-25 RX ORDER — METRONIDAZOLE 500 MG/1
500 TABLET ORAL 3 TIMES DAILY
Qty: 21 TABLET | Refills: 0 | Status: SHIPPED | OUTPATIENT
Start: 2021-04-25 | End: 2021-05-02

## 2021-04-26 LAB
BACTERIAL VAGINOSIS DNA: POSITIVE
CANDIDA GLABRATA DNA: NEGATIVE
CANDIDA KRUSEI DNA: NEGATIVE
CANDIDA RRNA VAG QL PROBE: NEGATIVE
T VAGINALIS RRNA GENITAL QL PROBE: NEGATIVE

## 2021-04-27 ENCOUNTER — TELEPHONE (OUTPATIENT)
Dept: NEUROLOGY | Facility: CLINIC | Age: 61
End: 2021-04-27

## 2021-06-07 NOTE — TELEPHONE ENCOUNTER
Spoke with patient in regards to results.    She verbalizes understanding.   Lot #: [id number] Lot #: [id number]

## 2024-03-28 NOTE — ED NOTES
Patient complaining of headache, Md informed.   
Quality 226: Preventive Care And Screening: Tobacco Use: Screening And Cessation Intervention: Patient screened for tobacco use and is an ex/non-smoker
Detail Level: Detailed